# Patient Record
Sex: FEMALE | Race: WHITE | Employment: UNEMPLOYED | URBAN - METROPOLITAN AREA
[De-identification: names, ages, dates, MRNs, and addresses within clinical notes are randomized per-mention and may not be internally consistent; named-entity substitution may affect disease eponyms.]

---

## 2017-08-14 ENCOUNTER — HOSPITAL ENCOUNTER (OUTPATIENT)
Dept: PREADMISSION TESTING | Age: 72
Discharge: HOME | End: 2017-08-14
Payer: MEDICARE

## 2017-08-14 VITALS — WEIGHT: 141 LBS | BODY MASS INDEX: 24.98 KG/M2 | HEIGHT: 63 IN

## 2017-08-14 LAB
ANION GAP BLD CALC-SCNC: 12 MMOL/L (ref 3–18)
ATRIAL RATE: 70 BPM
BACTERIA SPEC CULT: NORMAL
BUN SERPL-MCNC: 14 MG/DL (ref 7–18)
BUN/CREAT SERPL: 13 (ref 12–20)
CALCIUM SERPL-MCNC: 8.4 MG/DL (ref 8.5–10.1)
CALCULATED P AXIS, ECG09: 71 DEGREES
CALCULATED R AXIS, ECG10: 61 DEGREES
CALCULATED T AXIS, ECG11: 82 DEGREES
CHLORIDE SERPL-SCNC: 103 MMOL/L (ref 100–108)
CO2 SERPL-SCNC: 26 MMOL/L (ref 21–32)
CREAT SERPL-MCNC: 1.08 MG/DL (ref 0.6–1.3)
DIAGNOSIS, 93000: NORMAL
ERYTHROCYTE [DISTWIDTH] IN BLOOD BY AUTOMATED COUNT: 14.6 % (ref 11.6–14.5)
GLUCOSE SERPL-MCNC: 97 MG/DL (ref 74–99)
HCT VFR BLD AUTO: 36.5 % (ref 35–45)
HGB BLD-MCNC: 11.9 G/DL (ref 12–16)
MCH RBC QN AUTO: 31.2 PG (ref 24–34)
MCHC RBC AUTO-ENTMCNC: 32.6 G/DL (ref 31–37)
MCV RBC AUTO: 95.5 FL (ref 74–97)
P-R INTERVAL, ECG05: 162 MS
PLATELET # BLD AUTO: 308 K/UL (ref 135–420)
PMV BLD AUTO: 9.8 FL (ref 9.2–11.8)
POTASSIUM SERPL-SCNC: 3.2 MMOL/L (ref 3.5–5.5)
Q-T INTERVAL, ECG07: 434 MS
QRS DURATION, ECG06: 100 MS
QTC CALCULATION (BEZET), ECG08: 468 MS
RBC # BLD AUTO: 3.82 M/UL (ref 4.2–5.3)
SERVICE CMNT-IMP: NORMAL
SODIUM SERPL-SCNC: 141 MMOL/L (ref 136–145)
VENTRICULAR RATE, ECG03: 70 BPM
WBC # BLD AUTO: 6.6 K/UL (ref 4.6–13.2)

## 2017-08-14 PROCEDURE — 93005 ELECTROCARDIOGRAM TRACING: CPT

## 2017-08-14 PROCEDURE — 85027 COMPLETE CBC AUTOMATED: CPT | Performed by: ORTHOPAEDIC SURGERY

## 2017-08-14 PROCEDURE — 80048 BASIC METABOLIC PNL TOTAL CA: CPT | Performed by: ORTHOPAEDIC SURGERY

## 2017-08-14 PROCEDURE — 87641 MR-STAPH DNA AMP PROBE: CPT | Performed by: ORTHOPAEDIC SURGERY

## 2017-08-14 RX ORDER — HYDROCODONE BITARTRATE AND ACETAMINOPHEN 10; 300 MG/1; MG/1
1 TABLET ORAL AS NEEDED
COMMUNITY
End: 2017-08-29

## 2017-08-14 RX ORDER — CEFAZOLIN SODIUM 2 G/50ML
2 SOLUTION INTRAVENOUS ONCE
Status: CANCELLED | OUTPATIENT
Start: 2017-08-14 | End: 2017-08-14

## 2017-08-14 RX ORDER — SODIUM CHLORIDE, SODIUM LACTATE, POTASSIUM CHLORIDE, CALCIUM CHLORIDE 600; 310; 30; 20 MG/100ML; MG/100ML; MG/100ML; MG/100ML
125 INJECTION, SOLUTION INTRAVENOUS CONTINUOUS
Status: CANCELLED | OUTPATIENT
Start: 2017-08-14

## 2017-08-14 RX ORDER — ATORVASTATIN CALCIUM 20 MG/1
20 TABLET, FILM COATED ORAL DAILY
COMMUNITY
End: 2022-10-06 | Stop reason: CLARIF

## 2017-08-14 NOTE — PERIOP NOTES
Denies sleep apnea does not meet criteria for special population ,lex prep reviewed ,no IV or BP left arm

## 2017-08-21 ENCOUNTER — HOSPITAL ENCOUNTER (OUTPATIENT)
Dept: PREADMISSION TESTING | Age: 72
Discharge: HOME | End: 2017-08-21
Attending: ORTHOPAEDIC SURGERY
Payer: MEDICARE

## 2017-08-21 LAB — POTASSIUM SERPL-SCNC: 3 MMOL/L (ref 3.5–5.5)

## 2017-08-21 PROCEDURE — 36415 COLL VENOUS BLD VENIPUNCTURE: CPT | Performed by: ORTHOPAEDIC SURGERY

## 2017-08-21 PROCEDURE — 84132 ASSAY OF SERUM POTASSIUM: CPT | Performed by: ORTHOPAEDIC SURGERY

## 2017-08-21 NOTE — H&P
Patient Name:  Bart Toribio  YOB: 1945      Chief Complaint:  Lower back pain and bilateral hip pain. History of Chief Complaint:  Ms. Fabi Brewer is a 75-year-old female who is being seen for lower back pain and bilateral hip pain. She has had lower back pain and bilateral hip pain for one year. She does not remember a specific injury. She is bent forwards walking and has to hold onto something all the time. She has a history of L4-5 stenosis in 2008. The pain is constant. She has no numbness and no problems with her balance. She has weakness. She has chronic urinary incontinence. She is using a motorized cart. She finds that walking for any length of time makes the pain worse. Sitting makes it better. She has had no chiropractic treatment, on physical therapy, and no injections. She has been on Vicodin 10 mg one every four hours for one year per oncology, Nicholas Viveros NP. She has a history of two lumbar surgeries done by Dr. Claude Mathieu at [facility] including an L5-S1 fusion over 10 years ago. She had an MRI scan done at [facility] on 03/10/17.      Past Medical/Surgical History:    Disease/Disorder Type Date Side Surgery Date Side Comment   Cancer, breast       AMD 07/18/2017 -   High cholesterol       AMD 07/18/2017 -   Hypertension            Allergies:    Ingredient Reaction Medication Name Comment   IODINE      IODINATED CONTRAST MEDIA - ORAL AND IV DYE  Aleve   Triamterene          Current Medications:    Medication Directions   sertraline 100 mg tablet take 1 tablet by oral route  every day   amlodipine 10 mg tablet take 1 tablet by oral route  every day   atorvastatin 20 mg tablet take 1 tablet by oral route  every day   Aspirin Low Dose 81 mg tablet,delayed release take 1 tablet by oral route  every day   hydrocodone 10 mg-acetaminophen 325 mg tablet take 1 tablet by oral route  every 4 hours as needed for pain     Social History:      SMOKING  Status Tobacco Type Units Per Day Yrs Used   Current every day smoker        ALCOHOL  There is no history of alcohol use     Family History:    Disease Detail Family Member Age Cause of Death Comments   Cancer, unknown Mother  N      Review of Systems:    Pertinent positives include shortness of breath. Pertinent negatives include blurred vision, chest pain, chills, cold, discharge of the eyes, dizziness, double vision, fever, headache, hearing loss, heart murmur, itching of the eyes, palpitations, redness of the eyes, rheumatic fever, ringing in ears, sore throat/hoarseness, weight change, abdominal pain, anxiety, bipolar disorder, bladder/kidney infection, bloody stool, blood in urine, burning sensation, changes in mood, chronic cough, depression, diarrhea, difficulty breathing, difficulty swallowing, fainting, frequent urinating, fracture/dislocation, gas/bloating, gout, hemorrhoids, incontinence, joint pain, joint stiffness, loss of balance, memory loss, muscle weakness, nausea/vomiting, numbness/tingling, pain on breathing, painful urination, psoriasis, rash/itching, Raynaud's phenomenon, rheumatoid disease, seizure disorder, sprain/strain, swelling of feet, tendonitis, varicose veins and wheezing. Vitals:  Date BP Pulse Temp (F) Resp. (per min.) Height (Total in.) Weight (lbs.) BMI   07/13/2017     63.00 145.00 25.69     Physical Examination:    General:  The patient appears healthy. Cardiovascular:  Arterial pulses are normal.  Skin:  The skin is normal appearing with no contusions or wounds noted. Heart- RRR  Lungs-CTA berry  Abdomen- +BS,soft,nontender  Musculoskeletal:  There is tenderness of the right PSIS and right greater trochanter. The thoracolumbar spine has normal kyphosis, a normal appearance, and no scoliosis. There is full range of motion of the cervical, thoracic, and lumbar spine and of the hips, knees, and ankles. Straight leg raising and crossed straight leg raising tests are negative.       Neurological:  There is no weakness in the thoracic, lumbar, or sacral spine or in the lower extremities or hips. Deep tendon reflexes are present and normal bilaterally. Ankle and knee jerks are normal with no clonus. Radiograph Examination:  An AP view of the pelvis was obtained and interpreted in the office 7/13/17 and reveals a left-sided hip replacement. AP, lateral, bilateral oblique, flexion and extension views of the lumbar spine were obtained and interpreted in the officeand reveal degenerative disc disease at L1 through S1 with L5-S1 pedicle screws and posterior lumbar interbody fusion in place with spondylolisthesis at L4-5. Review of her MRI scan of the lumbar spine [facility] 3/17/17 reveals severe spinal stenosis and degenerative disc disease at L2 through L5 with a previous fusion at L5-S1. Impression:  Ms. Shania Story and I discussed treatments for her lower back pain, spinal stenosis, and degenerative disc disease condition including surgical intervention, the risks, and benefits as well as the different surgical approaches and decision making. We also discussed nonsurgical care for this condition including medications, injections, physical therapy, rehabilitation, activity modification, and brace utilization. At this point, she would like to proceed with operative intervention. We will plan for L2 to S1 revision decompression and fusion. The risks versus the benefits as well as the alternatives were fully explained to the patient. Risks include, but are not limited to, paralysis, death, heart attack, stroke, pulmonary embolism, deep vein thrombosis, infection, failure to relieve pain, increase in back or leg pain, reherniation of disc material, need for revision surgery, scarring, spinal fluid leak, bowel or bladder dysfunction, disease transmission, chronic graft site pain, instrumentation failure, pseudarthrosis, and the need for chronic ambulatory assist devices.   The patient states full understanding of the risks and benefits and wishes to proceed.

## 2017-08-21 NOTE — PERIOP NOTES
Phone call to pt. Repeat K+ 3.0.  (Was 3.2)  Pt instructed to contact PCP re K+. Valorie Alves at [facility] notified.

## 2017-08-22 PROBLEM — M51.26 HNP (HERNIATED NUCLEUS PULPOSUS), LUMBAR: Status: ACTIVE | Noted: 2017-08-22

## 2017-08-22 PROBLEM — Z98.890 STATUS POST LUMBAR SURGERY: Status: ACTIVE | Noted: 2017-08-22

## 2017-08-22 PROBLEM — M51.36 DDD (DEGENERATIVE DISC DISEASE), LUMBAR: Status: ACTIVE | Noted: 2017-08-22

## 2017-08-22 PROBLEM — M48.061 SPINAL STENOSIS, LUMBAR: Status: ACTIVE | Noted: 2017-08-22

## 2017-08-28 ENCOUNTER — APPOINTMENT (OUTPATIENT)
Dept: GENERAL RADIOLOGY | Age: 72
End: 2017-08-28
Attending: ORTHOPAEDIC SURGERY
Payer: MEDICARE

## 2017-08-28 ENCOUNTER — ANESTHESIA (OUTPATIENT)
Dept: SURGERY | Age: 72
End: 2017-08-28
Payer: MEDICARE

## 2017-08-28 ENCOUNTER — ANESTHESIA EVENT (OUTPATIENT)
Dept: SURGERY | Age: 72
End: 2017-08-28
Payer: MEDICARE

## 2017-08-28 ENCOUNTER — HOSPITAL ENCOUNTER (OUTPATIENT)
Age: 72
Setting detail: OBSERVATION
Discharge: HOME HEALTH | End: 2017-08-29
Attending: ORTHOPAEDIC SURGERY | Admitting: ORTHOPAEDIC SURGERY
Payer: MEDICARE

## 2017-08-28 LAB
ABO + RH BLD: NORMAL
BLOOD GROUP ANTIBODIES SERPL: NORMAL
SPECIMEN EXP DATE BLD: NORMAL

## 2017-08-28 PROCEDURE — 74011250636 HC RX REV CODE- 250/636: Performed by: ORTHOPAEDIC SURGERY

## 2017-08-28 PROCEDURE — 77030018836 HC SOL IRR NACL ICUM -A: Performed by: ORTHOPAEDIC SURGERY

## 2017-08-28 PROCEDURE — 36415 COLL VENOUS BLD VENIPUNCTURE: CPT | Performed by: ORTHOPAEDIC SURGERY

## 2017-08-28 PROCEDURE — 74011000250 HC RX REV CODE- 250

## 2017-08-28 PROCEDURE — C1713 ANCHOR/SCREW BN/BN,TIS/BN: HCPCS | Performed by: ORTHOPAEDIC SURGERY

## 2017-08-28 PROCEDURE — 77030032490 HC SLV COMPR SCD KNE COVD -B: Performed by: ORTHOPAEDIC SURGERY

## 2017-08-28 PROCEDURE — 77030012406 HC DRN WND PENRS BARD -A: Performed by: ORTHOPAEDIC SURGERY

## 2017-08-28 PROCEDURE — 74011250636 HC RX REV CODE- 250/636: Performed by: ANESTHESIOLOGY

## 2017-08-28 PROCEDURE — 74011000250 HC RX REV CODE- 250: Performed by: PHYSICIAN ASSISTANT

## 2017-08-28 PROCEDURE — 77030008683 HC TU ET CUF COVD -A: Performed by: NURSE ANESTHETIST, CERTIFIED REGISTERED

## 2017-08-28 PROCEDURE — 74011250637 HC RX REV CODE- 250/637: Performed by: PHYSICIAN ASSISTANT

## 2017-08-28 PROCEDURE — 74011250636 HC RX REV CODE- 250/636: Performed by: PHYSICIAN ASSISTANT

## 2017-08-28 PROCEDURE — 77030020782 HC GWN BAIR PAWS FLX 3M -B: Performed by: ORTHOPAEDIC SURGERY

## 2017-08-28 PROCEDURE — 74011000258 HC RX REV CODE- 258: Performed by: PHYSICIAN ASSISTANT

## 2017-08-28 PROCEDURE — 86900 BLOOD TYPING SEROLOGIC ABO: CPT | Performed by: ORTHOPAEDIC SURGERY

## 2017-08-28 PROCEDURE — 77030036646 HC GRFT DBM PTTY H-GENIN 10CC SPFT -G: Performed by: ORTHOPAEDIC SURGERY

## 2017-08-28 PROCEDURE — 76010000132 HC OR TIME 2.5 TO 3 HR: Performed by: ORTHOPAEDIC SURGERY

## 2017-08-28 PROCEDURE — 74011250636 HC RX REV CODE- 250/636

## 2017-08-28 PROCEDURE — 76060000036 HC ANESTHESIA 2.5 TO 3 HR: Performed by: ORTHOPAEDIC SURGERY

## 2017-08-28 PROCEDURE — 77030020407 HC IV BLD WRMR ST 3M -A: Performed by: NURSE ANESTHETIST, CERTIFIED REGISTERED

## 2017-08-28 PROCEDURE — 74011000250 HC RX REV CODE- 250: Performed by: ANESTHESIOLOGY

## 2017-08-28 PROCEDURE — 99218 HC RM OBSERVATION: CPT

## 2017-08-28 PROCEDURE — 77030013708 HC HNDPC SUC IRR PULS STRY –B: Performed by: ORTHOPAEDIC SURGERY

## 2017-08-28 PROCEDURE — 77030025167 HC ELECTRD VES SEAL 1 MEDT -F: Performed by: ORTHOPAEDIC SURGERY

## 2017-08-28 PROCEDURE — 77030003029 HC SUT VCRL J&J -B: Performed by: ORTHOPAEDIC SURGERY

## 2017-08-28 PROCEDURE — 77030011640 HC PAD GRND REM COVD -A: Performed by: ORTHOPAEDIC SURGERY

## 2017-08-28 PROCEDURE — 77030020262 HC SOL INJ SOD CL 0.9% 100ML: Performed by: ORTHOPAEDIC SURGERY

## 2017-08-28 PROCEDURE — 77030003028 HC SUT VCRL J&J -A: Performed by: ORTHOPAEDIC SURGERY

## 2017-08-28 PROCEDURE — 77030008462 HC STPLR SKN PROX J&J -A: Performed by: ORTHOPAEDIC SURGERY

## 2017-08-28 PROCEDURE — 77030013079 HC BLNKT BAIR HGGR 3M -A: Performed by: NURSE ANESTHETIST, CERTIFIED REGISTERED

## 2017-08-28 PROCEDURE — 77030006643: Performed by: NURSE ANESTHETIST, CERTIFIED REGISTERED

## 2017-08-28 PROCEDURE — 77030034849: Performed by: ORTHOPAEDIC SURGERY

## 2017-08-28 PROCEDURE — 77030020255 HC SOL INJ LR 1000ML BG: Performed by: ORTHOPAEDIC SURGERY

## 2017-08-28 PROCEDURE — 77030008477 HC STYL SATN SLP COVD -A: Performed by: NURSE ANESTHETIST, CERTIFIED REGISTERED

## 2017-08-28 PROCEDURE — 77030004402 HC BUR NEUR STRY -C: Performed by: ORTHOPAEDIC SURGERY

## 2017-08-28 PROCEDURE — 74011000250 HC RX REV CODE- 250: Performed by: ORTHOPAEDIC SURGERY

## 2017-08-28 PROCEDURE — 77030012891

## 2017-08-28 PROCEDURE — 76210000017 HC OR PH I REC 1.5 TO 2 HR: Performed by: ORTHOPAEDIC SURGERY

## 2017-08-28 DEVICE — SET SCR SPNL OD8-9MM PEDFUSE: Type: IMPLANTABLE DEVICE | Site: SPINE LUMBAR | Status: FUNCTIONAL

## 2017-08-28 DEVICE — SCREW SPNL L50MM OD7MM SLD GEN 3 PEDFUSE RESET: Type: IMPLANTABLE DEVICE | Site: SPINE LUMBAR | Status: FUNCTIONAL

## 2017-08-28 DEVICE — SCREW SPNL CRV 45-65 MM ADJ CRUX ASSY: Type: IMPLANTABLE DEVICE | Site: SPINE LUMBAR | Status: FUNCTIONAL

## 2017-08-28 DEVICE — ROD SPNL L120MM OD5.5MM LORDTC PEDFUSE: Type: IMPLANTABLE DEVICE | Site: SPINE LUMBAR | Status: FUNCTIONAL

## 2017-08-28 DEVICE — SET SCR SPNL L5-7MM PEDFUSE: Type: IMPLANTABLE DEVICE | Site: SPINE LUMBAR | Status: FUNCTIONAL

## 2017-08-28 DEVICE — SCREW SPNL L55MM OD7MM SLD GEN 3 PEDFUSE RESET: Type: IMPLANTABLE DEVICE | Site: SPINE LUMBAR | Status: FUNCTIONAL

## 2017-08-28 DEVICE — IMPLANTABLE DEVICE: Type: IMPLANTABLE DEVICE | Site: SPINE LUMBAR | Status: FUNCTIONAL

## 2017-08-28 DEVICE — ROD SPNL 5.5 MM DIA CVD LUM 130 MM LEN SMOOTH UN CUT W/O: Type: IMPLANTABLE DEVICE | Site: SPINE LUMBAR | Status: FUNCTIONAL

## 2017-08-28 RX ORDER — SODIUM CHLORIDE 0.9 % (FLUSH) 0.9 %
5-10 SYRINGE (ML) INJECTION AS NEEDED
Status: DISCONTINUED | OUTPATIENT
Start: 2017-08-28 | End: 2017-08-29 | Stop reason: HOSPADM

## 2017-08-28 RX ORDER — NALOXONE HYDROCHLORIDE 0.4 MG/ML
0.4 INJECTION, SOLUTION INTRAMUSCULAR; INTRAVENOUS; SUBCUTANEOUS AS NEEDED
Status: DISCONTINUED | OUTPATIENT
Start: 2017-08-28 | End: 2017-08-28 | Stop reason: HOSPADM

## 2017-08-28 RX ORDER — GLYCOPYRROLATE 0.2 MG/ML
INJECTION INTRAMUSCULAR; INTRAVENOUS AS NEEDED
Status: DISCONTINUED | OUTPATIENT
Start: 2017-08-28 | End: 2017-08-28 | Stop reason: HOSPADM

## 2017-08-28 RX ORDER — SODIUM CHLORIDE, SODIUM LACTATE, POTASSIUM CHLORIDE, CALCIUM CHLORIDE 600; 310; 30; 20 MG/100ML; MG/100ML; MG/100ML; MG/100ML
125 INJECTION, SOLUTION INTRAVENOUS CONTINUOUS
Status: DISCONTINUED | OUTPATIENT
Start: 2017-08-28 | End: 2017-08-29 | Stop reason: HOSPADM

## 2017-08-28 RX ORDER — DIPHENHYDRAMINE HYDROCHLORIDE 50 MG/ML
12.5 INJECTION, SOLUTION INTRAMUSCULAR; INTRAVENOUS
Status: DISCONTINUED | OUTPATIENT
Start: 2017-08-28 | End: 2017-08-29 | Stop reason: HOSPADM

## 2017-08-28 RX ORDER — TEMAZEPAM 15 MG/1
30 CAPSULE ORAL
Status: DISCONTINUED | OUTPATIENT
Start: 2017-08-28 | End: 2017-08-29 | Stop reason: HOSPADM

## 2017-08-28 RX ORDER — CEFAZOLIN SODIUM 2 G/50ML
2 SOLUTION INTRAVENOUS ONCE
Status: COMPLETED | OUTPATIENT
Start: 2017-08-28 | End: 2017-08-28

## 2017-08-28 RX ORDER — EPHEDRINE SULFATE/0.9% NACL/PF 25 MG/5 ML
SYRINGE (ML) INTRAVENOUS AS NEEDED
Status: DISCONTINUED | OUTPATIENT
Start: 2017-08-28 | End: 2017-08-28 | Stop reason: HOSPADM

## 2017-08-28 RX ORDER — AMLODIPINE BESYLATE 5 MG/1
10 TABLET ORAL DAILY
Status: DISCONTINUED | OUTPATIENT
Start: 2017-08-29 | End: 2017-08-29 | Stop reason: HOSPADM

## 2017-08-28 RX ORDER — SODIUM CHLORIDE 0.9 % (FLUSH) 0.9 %
5-10 SYRINGE (ML) INJECTION AS NEEDED
Status: DISCONTINUED | OUTPATIENT
Start: 2017-08-28 | End: 2017-08-28 | Stop reason: HOSPADM

## 2017-08-28 RX ORDER — NEOSTIGMINE METHYLSULFATE 5 MG/5 ML
SYRINGE (ML) INTRAVENOUS AS NEEDED
Status: DISCONTINUED | OUTPATIENT
Start: 2017-08-28 | End: 2017-08-28 | Stop reason: HOSPADM

## 2017-08-28 RX ORDER — OXYCODONE AND ACETAMINOPHEN 7.5; 325 MG/1; MG/1
1 TABLET ORAL
Status: DISCONTINUED | OUTPATIENT
Start: 2017-08-28 | End: 2017-08-29

## 2017-08-28 RX ORDER — HYDROMORPHONE HYDROCHLORIDE 1 MG/ML
INJECTION, SOLUTION INTRAMUSCULAR; INTRAVENOUS; SUBCUTANEOUS AS NEEDED
Status: DISCONTINUED | OUTPATIENT
Start: 2017-08-28 | End: 2017-08-28 | Stop reason: HOSPADM

## 2017-08-28 RX ORDER — DEXAMETHASONE SODIUM PHOSPHATE 4 MG/ML
INJECTION, SOLUTION INTRA-ARTICULAR; INTRALESIONAL; INTRAMUSCULAR; INTRAVENOUS; SOFT TISSUE AS NEEDED
Status: DISCONTINUED | OUTPATIENT
Start: 2017-08-28 | End: 2017-08-28 | Stop reason: HOSPADM

## 2017-08-28 RX ORDER — FENTANYL CITRATE 50 UG/ML
INJECTION, SOLUTION INTRAMUSCULAR; INTRAVENOUS AS NEEDED
Status: DISCONTINUED | OUTPATIENT
Start: 2017-08-28 | End: 2017-08-28 | Stop reason: HOSPADM

## 2017-08-28 RX ORDER — DIAZEPAM 5 MG/1
2.5 TABLET ORAL
Status: DISCONTINUED | OUTPATIENT
Start: 2017-08-28 | End: 2017-08-29

## 2017-08-28 RX ORDER — LIDOCAINE HYDROCHLORIDE 20 MG/ML
INJECTION, SOLUTION EPIDURAL; INFILTRATION; INTRACAUDAL; PERINEURAL AS NEEDED
Status: DISCONTINUED | OUTPATIENT
Start: 2017-08-28 | End: 2017-08-28 | Stop reason: HOSPADM

## 2017-08-28 RX ORDER — ACETAMINOPHEN 10 MG/ML
960 INJECTION, SOLUTION INTRAVENOUS ONCE
Status: COMPLETED | OUTPATIENT
Start: 2017-08-28 | End: 2017-08-28

## 2017-08-28 RX ORDER — ONDANSETRON 4 MG/1
4 TABLET, ORALLY DISINTEGRATING ORAL
Status: DISCONTINUED | OUTPATIENT
Start: 2017-08-28 | End: 2017-08-29 | Stop reason: HOSPADM

## 2017-08-28 RX ORDER — LABETALOL HCL 20 MG/4 ML
SYRINGE (ML) INTRAVENOUS AS NEEDED
Status: DISCONTINUED | OUTPATIENT
Start: 2017-08-28 | End: 2017-08-28 | Stop reason: HOSPADM

## 2017-08-28 RX ORDER — FENTANYL CITRATE 50 UG/ML
50 INJECTION, SOLUTION INTRAMUSCULAR; INTRAVENOUS
Status: DISCONTINUED | OUTPATIENT
Start: 2017-08-28 | End: 2017-08-28 | Stop reason: HOSPADM

## 2017-08-28 RX ORDER — METOCLOPRAMIDE HYDROCHLORIDE 5 MG/ML
INJECTION INTRAMUSCULAR; INTRAVENOUS AS NEEDED
Status: DISCONTINUED | OUTPATIENT
Start: 2017-08-28 | End: 2017-08-28 | Stop reason: HOSPADM

## 2017-08-28 RX ORDER — SODIUM CHLORIDE 0.9 % (FLUSH) 0.9 %
5-10 SYRINGE (ML) INJECTION EVERY 8 HOURS
Status: DISCONTINUED | OUTPATIENT
Start: 2017-08-28 | End: 2017-08-29 | Stop reason: HOSPADM

## 2017-08-28 RX ORDER — OXYCODONE AND ACETAMINOPHEN 5; 325 MG/1; MG/1
1 TABLET ORAL
Status: DISCONTINUED | OUTPATIENT
Start: 2017-08-28 | End: 2017-08-29

## 2017-08-28 RX ORDER — ROCURONIUM BROMIDE 10 MG/ML
INJECTION, SOLUTION INTRAVENOUS AS NEEDED
Status: DISCONTINUED | OUTPATIENT
Start: 2017-08-28 | End: 2017-08-28 | Stop reason: HOSPADM

## 2017-08-28 RX ORDER — FLUMAZENIL 0.1 MG/ML
0.2 INJECTION INTRAVENOUS
Status: DISCONTINUED | OUTPATIENT
Start: 2017-08-28 | End: 2017-08-28 | Stop reason: HOSPADM

## 2017-08-28 RX ORDER — ACETAMINOPHEN 325 MG/1
650 TABLET ORAL
Status: DISCONTINUED | OUTPATIENT
Start: 2017-08-28 | End: 2017-08-29 | Stop reason: HOSPADM

## 2017-08-28 RX ORDER — CEFAZOLIN SODIUM 2 G/50ML
2 SOLUTION INTRAVENOUS EVERY 8 HOURS
Status: COMPLETED | OUTPATIENT
Start: 2017-08-28 | End: 2017-08-29

## 2017-08-28 RX ORDER — ATORVASTATIN CALCIUM 20 MG/1
20 TABLET, FILM COATED ORAL DAILY
Status: DISCONTINUED | OUTPATIENT
Start: 2017-08-29 | End: 2017-08-29 | Stop reason: HOSPADM

## 2017-08-28 RX ORDER — SODIUM CHLORIDE, SODIUM LACTATE, POTASSIUM CHLORIDE, CALCIUM CHLORIDE 600; 310; 30; 20 MG/100ML; MG/100ML; MG/100ML; MG/100ML
125 INJECTION, SOLUTION INTRAVENOUS CONTINUOUS
Status: DISCONTINUED | OUTPATIENT
Start: 2017-08-28 | End: 2017-08-28 | Stop reason: HOSPADM

## 2017-08-28 RX ORDER — ONDANSETRON 2 MG/ML
INJECTION INTRAMUSCULAR; INTRAVENOUS AS NEEDED
Status: DISCONTINUED | OUTPATIENT
Start: 2017-08-28 | End: 2017-08-28 | Stop reason: HOSPADM

## 2017-08-28 RX ORDER — MIDAZOLAM HYDROCHLORIDE 1 MG/ML
INJECTION, SOLUTION INTRAMUSCULAR; INTRAVENOUS AS NEEDED
Status: DISCONTINUED | OUTPATIENT
Start: 2017-08-28 | End: 2017-08-28 | Stop reason: HOSPADM

## 2017-08-28 RX ORDER — LABETALOL HYDROCHLORIDE 5 MG/ML
5 INJECTION, SOLUTION INTRAVENOUS ONCE
Status: COMPLETED | OUTPATIENT
Start: 2017-08-28 | End: 2017-08-28

## 2017-08-28 RX ORDER — DOCUSATE SODIUM 100 MG/1
100 CAPSULE, LIQUID FILLED ORAL 2 TIMES DAILY
Status: DISCONTINUED | OUTPATIENT
Start: 2017-08-28 | End: 2017-08-29 | Stop reason: HOSPADM

## 2017-08-28 RX ORDER — SERTRALINE HYDROCHLORIDE 50 MG/1
100 TABLET, FILM COATED ORAL DAILY
Status: DISCONTINUED | OUTPATIENT
Start: 2017-08-29 | End: 2017-08-29 | Stop reason: HOSPADM

## 2017-08-28 RX ORDER — PROPOFOL 10 MG/ML
INJECTION, EMULSION INTRAVENOUS AS NEEDED
Status: DISCONTINUED | OUTPATIENT
Start: 2017-08-28 | End: 2017-08-28 | Stop reason: HOSPADM

## 2017-08-28 RX ORDER — NALOXONE HYDROCHLORIDE 0.4 MG/ML
0.1 INJECTION, SOLUTION INTRAMUSCULAR; INTRAVENOUS; SUBCUTANEOUS AS NEEDED
Status: DISCONTINUED | OUTPATIENT
Start: 2017-08-28 | End: 2017-08-29 | Stop reason: HOSPADM

## 2017-08-28 RX ORDER — HYDROMORPHONE HYDROCHLORIDE 1 MG/ML
0.5 INJECTION, SOLUTION INTRAMUSCULAR; INTRAVENOUS; SUBCUTANEOUS
Status: DISCONTINUED | OUTPATIENT
Start: 2017-08-28 | End: 2017-08-28 | Stop reason: HOSPADM

## 2017-08-28 RX ADMIN — FENTANYL CITRATE 50 MCG: 50 INJECTION, SOLUTION INTRAMUSCULAR; INTRAVENOUS at 16:17

## 2017-08-28 RX ADMIN — GLYCOPYRROLATE 0.1 MG: 0.2 INJECTION INTRAMUSCULAR; INTRAVENOUS at 14:22

## 2017-08-28 RX ADMIN — DOCUSATE SODIUM 100 MG: 100 CAPSULE, LIQUID FILLED ORAL at 20:40

## 2017-08-28 RX ADMIN — SODIUM CHLORIDE, SODIUM LACTATE, POTASSIUM CHLORIDE, AND CALCIUM CHLORIDE 125 ML/HR: 600; 310; 30; 20 INJECTION, SOLUTION INTRAVENOUS at 11:42

## 2017-08-28 RX ADMIN — GLYCOPYRROLATE 0.1 MG: 0.2 INJECTION INTRAMUSCULAR; INTRAVENOUS at 14:27

## 2017-08-28 RX ADMIN — DEXAMETHASONE SODIUM PHOSPHATE 4 MG: 4 INJECTION, SOLUTION INTRA-ARTICULAR; INTRALESIONAL; INTRAMUSCULAR; INTRAVENOUS; SOFT TISSUE at 15:12

## 2017-08-28 RX ADMIN — FENTANYL CITRATE 50 MCG: 50 INJECTION, SOLUTION INTRAMUSCULAR; INTRAVENOUS at 14:22

## 2017-08-28 RX ADMIN — MIDAZOLAM HYDROCHLORIDE 1 MG: 1 INJECTION, SOLUTION INTRAMUSCULAR; INTRAVENOUS at 14:22

## 2017-08-28 RX ADMIN — FENTANYL CITRATE 50 MCG: 50 INJECTION, SOLUTION INTRAMUSCULAR; INTRAVENOUS at 14:27

## 2017-08-28 RX ADMIN — HYDROMORPHONE HYDROCHLORIDE: 10 INJECTION, SOLUTION INTRAMUSCULAR; INTRAVENOUS; SUBCUTANEOUS at 17:50

## 2017-08-28 RX ADMIN — ACETAMINOPHEN 1000 MG: 10 INJECTION, SOLUTION INTRAVENOUS at 15:14

## 2017-08-28 RX ADMIN — MIDAZOLAM HYDROCHLORIDE 1 MG: 1 INJECTION, SOLUTION INTRAMUSCULAR; INTRAVENOUS at 14:25

## 2017-08-28 RX ADMIN — Medication 5 MG: at 17:17

## 2017-08-28 RX ADMIN — CEFAZOLIN SODIUM 2 G: 2 SOLUTION INTRAVENOUS at 14:40

## 2017-08-28 RX ADMIN — FENTANYL CITRATE 50 MCG: 50 INJECTION, SOLUTION INTRAMUSCULAR; INTRAVENOUS at 15:12

## 2017-08-28 RX ADMIN — ROCURONIUM BROMIDE 10 MG: 10 INJECTION, SOLUTION INTRAVENOUS at 15:47

## 2017-08-28 RX ADMIN — SODIUM CHLORIDE, SODIUM LACTATE, POTASSIUM CHLORIDE, AND CALCIUM CHLORIDE: 600; 310; 30; 20 INJECTION, SOLUTION INTRAVENOUS at 16:03

## 2017-08-28 RX ADMIN — SODIUM CHLORIDE 1 G: 900 INJECTION, SOLUTION INTRAVENOUS at 14:42

## 2017-08-28 RX ADMIN — Medication 2 MG: at 17:08

## 2017-08-28 RX ADMIN — GLYCOPYRROLATE 0.4 MG: 0.2 INJECTION INTRAMUSCULAR; INTRAVENOUS at 17:08

## 2017-08-28 RX ADMIN — Medication 5 MG: at 15:52

## 2017-08-28 RX ADMIN — ROCURONIUM BROMIDE 5 MG: 10 INJECTION, SOLUTION INTRAVENOUS at 16:37

## 2017-08-28 RX ADMIN — FENTANYL CITRATE 25 MCG: 50 INJECTION, SOLUTION INTRAMUSCULAR; INTRAVENOUS at 17:09

## 2017-08-28 RX ADMIN — FENTANYL CITRATE 25 MCG: 50 INJECTION, SOLUTION INTRAMUSCULAR; INTRAVENOUS at 17:12

## 2017-08-28 RX ADMIN — METOCLOPRAMIDE HYDROCHLORIDE 10 MG: 5 INJECTION INTRAMUSCULAR; INTRAVENOUS at 14:22

## 2017-08-28 RX ADMIN — ROCURONIUM BROMIDE 50 MG: 10 INJECTION, SOLUTION INTRAVENOUS at 14:34

## 2017-08-28 RX ADMIN — OXYCODONE HYDROCHLORIDE AND ACETAMINOPHEN 1 TABLET: 7.5; 325 TABLET ORAL at 20:40

## 2017-08-28 RX ADMIN — SODIUM CHLORIDE, SODIUM LACTATE, POTASSIUM CHLORIDE, AND CALCIUM CHLORIDE: 600; 310; 30; 20 INJECTION, SOLUTION INTRAVENOUS at 17:09

## 2017-08-28 RX ADMIN — FENTANYL CITRATE 50 MCG: 50 INJECTION, SOLUTION INTRAMUSCULAR; INTRAVENOUS at 16:55

## 2017-08-28 RX ADMIN — CEFAZOLIN SODIUM 2 G: 2 SOLUTION INTRAVENOUS at 23:03

## 2017-08-28 RX ADMIN — LIDOCAINE HYDROCHLORIDE 100 MG: 20 INJECTION, SOLUTION EPIDURAL; INFILTRATION; INTRACAUDAL; PERINEURAL at 14:27

## 2017-08-28 RX ADMIN — HYDROMORPHONE HYDROCHLORIDE 0.5 MG: 1 INJECTION, SOLUTION INTRAMUSCULAR; INTRAVENOUS; SUBCUTANEOUS at 16:22

## 2017-08-28 RX ADMIN — ONDANSETRON 4 MG: 2 INJECTION INTRAMUSCULAR; INTRAVENOUS at 16:36

## 2017-08-28 RX ADMIN — LABETALOL HYDROCHLORIDE 5 MG: 5 INJECTION INTRAVENOUS at 18:34

## 2017-08-28 RX ADMIN — HYDROMORPHONE HYDROCHLORIDE 0.5 MG: 1 INJECTION, SOLUTION INTRAMUSCULAR; INTRAVENOUS; SUBCUTANEOUS at 16:40

## 2017-08-28 RX ADMIN — PROPOFOL 100 MG: 10 INJECTION, EMULSION INTRAVENOUS at 14:34

## 2017-08-28 RX ADMIN — FENTANYL CITRATE 50 MCG: 50 INJECTION, SOLUTION INTRAMUSCULAR; INTRAVENOUS at 14:32

## 2017-08-28 NOTE — PERIOP NOTES
TRANSFER - OUT REPORT:    Verbal report given to Laura White RN (name) on [place] [facility]  being transferred to [facility] (unit) for routine progression of care       Report consisted of patients Situation, Background, Assessment and   Recommendations(SBAR). Information from the following report(s) SBAR and Kardex was reviewed with the receiving nurse. Lines:   Peripheral IV 01/21/16 Right Forearm (Active)       Peripheral IV 08/28/17 Right Hand (Active)   Site Assessment Clean, dry, & intact 8/28/2017  6:25 PM   Phlebitis Assessment 0 8/28/2017  6:25 PM   Infiltration Assessment 0 8/28/2017  6:25 PM   Dressing Status Clean, dry, & intact 8/28/2017  6:25 PM   Dressing Type Tape;Transparent 8/28/2017  6:25 PM   Hub Color/Line Status Infusing 8/28/2017  6:25 PM        Opportunity for questions and clarification was provided.       Patient transported with:   O2 @ 2 liters

## 2017-08-28 NOTE — IP AVS SNAPSHOT
Dee Lucas 
 
 
 [address] [place] [address] 
[phone] Patient: Baron Glaser MRN: [id number] Current Discharge Medication List  
  
START taking these medications Dose & Instructions Dispensing Information Comments Morning Noon Evening Bedtime  
 diazePAM 5 mg tablet Commonly known as:  VALIUM Your last dose was: Your next dose is:    
   
   
 Dose:  5 mg Take 1 Tab by mouth every eight (8) hours as needed. Max Daily Amount: 15 mg. Quantity:  60 Tab Refills:  0 HYDROmorphone 4 mg tablet Commonly known as:  DILAUDID Your last dose was: Your next dose is:    
   
   
 Dose:  4-8 mg Take 1-2 Tabs by mouth every four (4) hours as needed. Max Daily Amount: 48 mg. Quantity:  90 Tab Refills:  0 CONTINUE these medications which have NOT CHANGED Dose & Instructions Dispensing Information Comments Morning Noon Evening Bedtime  
 amLODIPine 10 mg tablet Commonly known as:  Tatiana Daniel Your last dose was: Your next dose is:    
   
   
 Dose:  10 mg Take 10 mg by mouth daily. Indications: HYPERTENSION Refills:  0  
     
   
   
   
  
 atorvastatin 20 mg tablet Commonly known as:  LIPITOR Your last dose was: Your next dose is:    
   
   
 Dose:  20 mg Take 20 mg by mouth daily. Refills:  0  
     
   
   
   
  
 sertraline 100 mg tablet Commonly known as:  ZOLOFT Your last dose was: Your next dose is:    
   
   
 Dose:  100 mg Take 100 mg by mouth daily. Indications: ANXIETY WITH DEPRESSION Refills:  0 STOP taking these medications   
 aspirin delayed-release 81 mg tablet HYDROcodone-acetaminophen  mg Tab per tablet Commonly known as:  XODOL  
   
  
 temazepam 30 mg capsule Commonly known as:  RESTORIL  
   
  
  
  
 Where to Get Your Medications Information on where to get these meds will be given to you by the nurse or doctor. ! Ask your nurse or doctor about these medications  
  diazePAM 5 mg tablet HYDROmorphone 4 mg tablet

## 2017-08-28 NOTE — IP AVS SNAPSHOT
Em Braden 
 
 
 [address] [facility] [address] 
[phone] Patient: Dianne Wu MRN: [id number] You are allergic to the following Allergen Reactions Aleve (Naproxen Sodium) Shortness of Breath Rash Iodinated Contrast- Oral And Iv Dye Rash Cough Triamterene-Hydrochlorothiazid Itching Recent Documentation Height Weight BMI OB Status Smoking Status 1.6 m 63.1 kg 24.66 kg/m2 Hysterectomy Current Every Day Smoker Emergency Contacts Name Discharge Info Relation Home Work Mobile Penny Vale CAREGIVER [3] Daughter [21]   [phone] About your hospitalization You were admitted on:  August 28, 2017 You last received care in the:  [facility] [address] You were discharged on:  August 29, 2017 Unit phone number:  [phone] Why you were hospitalized Your primary diagnosis was:  Spinal Stenosis, Lumbar Your diagnoses also included:  Ddd (Degenerative Disc Disease), Lumbar, Hnp (Herniated Nucleus Pulposus), Lumbar, Status Post Lumbar Surgery Providers Seen During Your Hospitalizations Provider Role Specialty Primary office phone Mallorie Stevens MD Attending Provider Orthopedic Surgery [phone] Your Primary Care Physician (PCP) Primary Care Physician Office Phone Office Fax [address]  [phone] Follow-up Information Follow up With Details Comments Contact Info Dakota Cole MD   [address] 
[phone] Mallorie Stevens MD On 9/12/2017 Follow up appointment @ 10:15am [facility] YASEMIN FARLEY [facility] Orthopedic and [address] [place] [address] 
[phone] [address] [facility] to continue managing your healthcare needs. [phone] Current Discharge Medication List  
  
START taking these medications Dose & Instructions Dispensing Information Comments Morning Noon Evening Bedtime  
 diazePAM 5 mg tablet Commonly known as:  VALIUM Your last dose was: Your next dose is:    
   
   
 Dose:  5 mg Take 1 Tab by mouth every eight (8) hours as needed. Max Daily Amount: 15 mg. Quantity:  60 Tab Refills:  0 HYDROmorphone 4 mg tablet Commonly known as:  DILAUDID Your last dose was: Your next dose is:    
   
   
 Dose:  4-8 mg Take 1-2 Tabs by mouth every four (4) hours as needed. Max Daily Amount: 48 mg. Quantity:  90 Tab Refills:  0 CONTINUE these medications which have NOT CHANGED Dose & Instructions Dispensing Information Comments Morning Noon Evening Bedtime  
 amLODIPine 10 mg tablet Commonly known as:  Dieter Rings Your last dose was: Your next dose is:    
   
   
 Dose:  10 mg Take 10 mg by mouth daily. Indications: HYPERTENSION Refills:  0  
     
   
   
   
  
 atorvastatin 20 mg tablet Commonly known as:  LIPITOR Your last dose was: Your next dose is:    
   
   
 Dose:  20 mg Take 20 mg by mouth daily. Refills:  0  
     
   
   
   
  
 sertraline 100 mg tablet Commonly known as:  ZOLOFT Your last dose was: Your next dose is:    
   
   
 Dose:  100 mg Take 100 mg by mouth daily. Indications: ANXIETY WITH DEPRESSION Refills:  0 STOP taking these medications   
 aspirin delayed-release 81 mg tablet HYDROcodone-acetaminophen  mg Tab per tablet Commonly known as:  XODOL  
   
  
 temazepam 30 mg capsule Commonly known as:  RESTORIL Where to Get Your Medications Information on where to get these meds will be given to you by the nurse or doctor. ! Ask your nurse or doctor about these medications  
  diazePAM 5 mg tablet HYDROmorphone 4 mg tablet Discharge Instructions [address] Dr. Maicol Simon *YOU MUST AVOID SMOKING OR BEING AROUND ANYONE WHO SMOKES. AVOID ALL PRODUCTS THAT CONTAIN NICOTINE. DO NOT TAKE IBUPROFEN OR ANTI--INFLAMMATORIES, AS THESE MAY ALTER THE HEALING OF THE FUSION. ACTIVITIES : 
*The first week after surgery 1. You may be up and walking about the house. 2.  Activities around the house, such as washing dishes, fixing light meals, and your own personal care are fine. 3.  Avoid strenuous activities, such as vacuuming, lifting laundry or grocery bags. 4.  Do not lift anything heavier than 1 gallon of milk (or about 5-8 pounds). 5.  Do not bend over to  items from the ground level until 3 months post-op. *Week 2 and beyond 1. You may gradually increase your activities, but still avoid heavy lifting, pushing/pulling. 2.  Walking is the best way to rebuild strength and stamina. Start SLOWLY and gradually increase the distance a little every week. 3.  Walk at a pace that avoids fatigue or severe pain. Do not try to walk several blocks the first day! As you increase the distance, you may feel tired. If so, stop and rest.  
4.  You should be able to walk several blocks by your first clinic visit. 5.  Follow-up with Dr. Lenora Martinez in 10-14 days. BATHING and INCISION CARE: 
1. The incision may be tender to touch or feel numb: this is normal.  
2.  Keep the incision clean and dry. Do not get incision wet for 5 days. The incision will be closed with sutures under the skin and the skin will be glued. 3.  Do not apply any lotions, ointments or oils on the incision. 4.  If you notice any excessive swelling, redness, or persistent drainage around the incision, notify the office immediately. DRIVIN. You should not drive until after your follow-up appointment.   
2.  You can be in a vehicle for short distances, but if you travel any long distance, please stop about every 30 minutes and walk/stretch. 3.  You should NEVER drive while taking narcotic medication. RETURN TO WORK : 
1. The decision to return to work will be determined on an individual basis. 2.  Many people who have a strenuous job (construction, heavy labor, etc) may need to be off work for up to 12 weeks. 3.  If you need a work note, please let us know as soon as possible, and not the same day you are planning to return to work. NUTRITION : 
1.  Good nutrition is an essential part of healing. 2.  You should eat a balanced diet each day, including fruits, vegetables, dairy products and protein. 3.  Remember to drink plenty of water. 4.  If you have not had a bowel movement within 3 days of surgery, you will need to use a laxative or suppository that can be obtained over-the-counter at your local pharmacy. BONE STIMULATOR: 
1. A spinal bone stimulator may be prescribed for you under certain situations. 2.  A nurse will call you if one has been requested and discuss its use for approximately 4-6 months post-op every day. 3.  This device assists in bone healing and fusion. MEDICATIONS - 
1. You may resume the medications you were taking before surgery, with the general exception of (or DO NOT TAKE) non-steroidal anti-inflammatory medications, such as Motrin, Aleve, Advil Naprosyn, Ibuprofen or aspirin. 2.  You will receive a prescription for pain medication at discharge from the hospital. The pain medication works best if taken before the pain becomes severe. 3.  To reduce stomach upset, always take the medication with food. 4.  Begin to wean yourself off the pain medication during the second week after discharge. 5.  If you need a refill, please call the office during working hours at least 2 days before your prescription runs out. Do not wait until your bottle is empty to call for a refill. 6.  DO NOT drive if you are taking narcotic pain medications. HOME HEALTH CARE: 
1.   A home health care service has been set-up for you to help assist you once you leave the hospital. 
2.  They will contact you either before you leave the hospital or within 24 hours once you have been discharged home. 3. A nurse will assist you with your dressing changes and a Physical Therapist with help you with your therapy needs. CALL THE OFFICE: 
? If you have severe pain unrelieved by the medications, new numbness or tingling in your legs; 
? If you have a fever of 101.0°F or greater;  
? If you notice excessive swelling, redness, or persistent drainage from the incision or IV site; [facility] office number is [phone] from 8:00am to 5:00pm Monday through Friday. After 5:00pm, on weekends, or holidays, please leave a message with our answering service and the doctor on-call will get back to you shortly. Discharge Orders None Introducing [facility]! Sim Hubbard introduces TrustAlert patient portal. Now you can access parts of your medical record, email your doctor's office, and request medication refills online. 1. In your internet browser, go to [email or web address] 2. Click on the First Time User? Click Here link in the Sign In box. You will see the New Member Sign Up page. 3. Enter your TrustAlert Access Code exactly as it appears below. You will not need to use this code after youve completed the sign-up process. If you do not sign up before the expiration date, you must request a new code. · TrustAlert Access Code: [email or web address] Expires: 11/7/2017  1:42 PM 
 
4. Enter the last four digits of your Social Security Number (xxxx) and Date of Birth (mm/dd/yyyy) as indicated and click Submit. You will be taken to the next sign-up page. 5. Create a TrustAlert ID.  This will be your TrustAlert login ID and cannot be changed, so think of one that is secure and easy to remember. 6. Create a [facility] password. You can change your password at any time. 7. Enter your Password Reset Question and Answer. This can be used at a later time if you forget your password. 8. Enter your e-mail address. You will receive e-mail notification when new information is available in [address] 9. Click Sign Up. You can now view and download portions of your medical record. 10. Click the Download Summary menu link to download a portable copy of your medical information. If you have questions, please visit the Frequently Asked Questions section of the [facility] website. Remember, [facility] is NOT to be used for urgent needs. For medical emergencies, dial 911. Now available from your iPhone and Android! General Information Please provide this summary of care documentation to your next provider. Patient Signature:  ____________________________________________________________ Date:  ____________________________________________________________  
  
Marti Valee Provider Signature:  ____________________________________________________________ Date:  ____________________________________________________________

## 2017-08-28 NOTE — INTERVAL H&P NOTE
H&P Update:  Renetta Bhatt was seen and examined. History and physical has been reviewed. The patient has been examined.  There have been no significant clinical changes since the completion of the originally dated History and Physical.    Signed By: Donavan Han MD     August 28, 2017 7:13 AM

## 2017-08-28 NOTE — ANESTHESIA POSTPROCEDURE EVALUATION
Post-Anesthesia Evaluation and Assessment    Cardiovascular Function/Vital Signs  Visit Vitals    /65    Pulse 69    Temp 36.8 °C (98.2 °F)    Resp 12    Ht 5' 3\" (1.6 m)    Wt 63.1 kg (139 lb 3 oz)    SpO2 97%    BMI 24.66 kg/m2       Patient is status post Procedure(s):  L2-S1 REVISION DECOMPRESSION & FUSION W/C-ARM. Nausea/Vomiting: Controlled. Postoperative hydration reviewed and adequate. Pain:  Pain Scale 1: FLACC (08/28/17 1859)  Pain Intensity 1: 0 (08/28/17 1859)   Managed. Neurological Status:   Neuro (WDL): Exceptions to WDL (08/28/17 1821)   At baseline. Mental Status and Level of Consciousness: Arousable. Pulmonary Status:   O2 Device: Nasal cannula (08/28/17 1859)   Adequate oxygenation and airway patent. Complications related to anesthesia: None    Post-anesthesia assessment completed. Patient has met all discharge requirements.     Signed By: Bianka Recio MD    August 28, 2017

## 2017-08-28 NOTE — BRIEF OP NOTE
BRIEF OPERATIVE NOTE    Date of Procedure: 8/28/2017   Preoperative Diagnosis: LUMBAR/LUMBOSACRAL DISK DEGENERATION,LUMBAGO,LUMBAR SPONDYLOLISTHESIS,LUMBAR STENOSIS,ELEVATED CHOLESTEROL, HYPERTENSION  Postoperative Diagnosis: LUMBAR/LUMBOSACRAL DISK DEGENERATION,LUMBAGO,LUMBAR SPONDYLOLISTHESIS,LUMBAR STENOSIS,ELEVATED CHOLESTEROL, HYPERTENSION    Procedure: Procedure(s):  L2-S1 REVISION DECOMPRESSION & FUSION W/C-ARM  Surgeon(s) and Role:     * Reina Muhammad MD - Primary  Assistant: Carola Petty PA-C  Anesthesia: General   Estimated Blood Loss: 150cc  Specimens: * No specimens in log *   Findings: spinal stenosis, DDD, HNP L2-S1. Previous L5-S1 fusion with hardware  Complications: none  Implants:   Implant Name Type Inv.  Item Serial No.  Lot No. LRB No. Used Action   GRAFT PUTTY DBM H-GENIN 10CC --  - [id number]  GRAFT PUTTY DBM H-GENIN 10CC --  [id number] SPINEFRONTIER [id number] N/A 1 Implanted   SCR PDCL RESET SLD 7.0X55MM -- GEN 3 - [id number]  SCR PDCL RESET SLD 7.0X55MM -- GEN 3  SPINEFRONTIER [id number] N/A 4 Implanted   SCR PDCL RESET SLD 7.0X50MM -- GEN 3 - [id number]  SCR PDCL RESET SLD 7.0X50MM -- GEN 3  SPINEFRONTIER [id number] N/A 4 Implanted   SCR PDCL SLD PEDFUSE 8.0X45MM --  - [id number]  SCR PDCL SLD PEDFUSE 8.0X45MM --   SPINEFRONTIER [id number] N/A 2 Implanted   ABENA SP LORDTC PEDFUSE 5.5X120 --  - [id number]  ABENA SP LORDTC PEDFUSE 5.5X120 --   SPINEFRONTIER CF24 N/A 1 Implanted   SCR SET PEDFUSE 8-9MM --  - [id number]  SCR SET PEDFUSE 8-9MM --   SPINEFRONTIER [id number] N/A 2 Implanted   ABENA SP LORDTC PEDFUSE 5.5X130 --  - [id number]  ABENA SP LORDTC PEDFUSE 5.5X130 --   SPINEFRONTIER [id number] N/A 1 Implanted   SCR SET PEDFUSE 5-7MM --  - [id number]  SCR SET PEDFUSE 5-7MM --   SPINEFRONTIER [id number] N/A 8 Implanted   SCR SPNE ADJ CRV 45-65MM -- PEDFUSE - [id number]   SCR SPNE ADJ CRV 45-65MM -- PEDFUSE   SPINEFRONTIER [id number] N/A 1 Implanted

## 2017-08-28 NOTE — ANESTHESIA PREPROCEDURE EVALUATION
Anesthetic History     PONV (mild nausea/vomiting after several of her surgeries in the past; responsive to decadron, zofran and phenergan)          Review of Systems / Medical History  Patient summary reviewed, nursing notes reviewed and pertinent labs reviewed    Pulmonary    COPD (presumed COPD due to 30 pack year smoking history; no diagnosis listed in CC)      Smoker (pt non-compliant with instructions to abstain from smoking on DOS)    Pertinent negatives: No sleep apnea     Neuro/Psych         Psychiatric history (h/o depression)     Cardiovascular    Hypertension (took meds this AM): well controlled          Hyperlipidemia  Pertinent negatives: No past MI and angina  Exercise tolerance: >4 METS  Comments: Moderate risk per clearance note   GI/Hepatic/Renal     GERD (very rare)        Pertinent negatives: No liver disease and renal disease   Endo/Other        Arthritis  Pertinent negatives: No diabetes and obesity   Other Findings   Comments: H/o hypokalemia; currently K+ 4.4           Physical Exam    Airway  Mallampati: II  TM Distance: 4 - 6 cm  Neck ROM: normal range of motion   Mouth opening: Normal     Cardiovascular    Rhythm: regular  Rate: normal         Dental    Dentition: Edentulous     Pulmonary  Breath sounds clear to auscultation               Abdominal  GI exam deferred       Other Findings            Anesthetic Plan    ASA: 2  Anesthesia type: general          Induction: Intravenous  Anesthetic plan and risks discussed with: Patient      Plan GETA. Pt understands risks and agrees to proceed.

## 2017-08-29 VITALS
HEART RATE: 92 BPM | WEIGHT: 139.19 LBS | OXYGEN SATURATION: 98 % | HEIGHT: 63 IN | DIASTOLIC BLOOD PRESSURE: 75 MMHG | SYSTOLIC BLOOD PRESSURE: 147 MMHG | TEMPERATURE: 99.5 F | BODY MASS INDEX: 24.66 KG/M2 | RESPIRATION RATE: 18 BRPM

## 2017-08-29 PROBLEM — M48.061 SPINAL STENOSIS, LUMBAR: Status: RESOLVED | Noted: 2017-08-22 | Resolved: 2017-08-29

## 2017-08-29 PROBLEM — M51.26 HNP (HERNIATED NUCLEUS PULPOSUS), LUMBAR: Status: RESOLVED | Noted: 2017-08-22 | Resolved: 2017-08-29

## 2017-08-29 LAB
ERYTHROCYTE [DISTWIDTH] IN BLOOD BY AUTOMATED COUNT: 14.4 % (ref 11.6–14.5)
HCT VFR BLD AUTO: 34 % (ref 35–45)
HGB BLD-MCNC: 11.1 G/DL (ref 12–16)
MCH RBC QN AUTO: 31.2 PG (ref 24–34)
MCHC RBC AUTO-ENTMCNC: 32.6 G/DL (ref 31–37)
MCV RBC AUTO: 95.5 FL (ref 74–97)
PLATELET # BLD AUTO: 345 K/UL (ref 135–420)
PMV BLD AUTO: 9.7 FL (ref 9.2–11.8)
RBC # BLD AUTO: 3.56 M/UL (ref 4.2–5.3)
WBC # BLD AUTO: 14.3 K/UL (ref 4.6–13.2)

## 2017-08-29 PROCEDURE — 85027 COMPLETE CBC AUTOMATED: CPT | Performed by: PHYSICIAN ASSISTANT

## 2017-08-29 PROCEDURE — 97161 PT EVAL LOW COMPLEX 20 MIN: CPT

## 2017-08-29 PROCEDURE — 74011250637 HC RX REV CODE- 250/637: Performed by: ORTHOPAEDIC SURGERY

## 2017-08-29 PROCEDURE — 99218 HC RM OBSERVATION: CPT

## 2017-08-29 PROCEDURE — 74011250636 HC RX REV CODE- 250/636: Performed by: PHYSICIAN ASSISTANT

## 2017-08-29 PROCEDURE — 97116 GAIT TRAINING THERAPY: CPT

## 2017-08-29 PROCEDURE — 77010033678 HC OXYGEN DAILY

## 2017-08-29 PROCEDURE — 36415 COLL VENOUS BLD VENIPUNCTURE: CPT | Performed by: PHYSICIAN ASSISTANT

## 2017-08-29 PROCEDURE — 74011250637 HC RX REV CODE- 250/637: Performed by: PHYSICIAN ASSISTANT

## 2017-08-29 RX ORDER — HYDROMORPHONE HYDROCHLORIDE 4 MG/1
4-8 TABLET ORAL
Qty: 90 TAB | Refills: 0 | Status: SHIPPED | OUTPATIENT
Start: 2017-08-29 | End: 2017-09-27

## 2017-08-29 RX ORDER — HYDROMORPHONE HYDROCHLORIDE 2 MG/1
2-4 TABLET ORAL
Status: DISCONTINUED | OUTPATIENT
Start: 2017-08-29 | End: 2017-08-29

## 2017-08-29 RX ORDER — HYDROMORPHONE HYDROCHLORIDE 4 MG/1
4-8 TABLET ORAL
Status: DISCONTINUED | OUTPATIENT
Start: 2017-08-29 | End: 2017-08-29 | Stop reason: HOSPADM

## 2017-08-29 RX ORDER — DIAZEPAM 5 MG/1
5 TABLET ORAL
Status: DISCONTINUED | OUTPATIENT
Start: 2017-08-29 | End: 2017-08-29 | Stop reason: HOSPADM

## 2017-08-29 RX ORDER — DIAZEPAM 5 MG/1
5 TABLET ORAL
Qty: 60 TAB | Refills: 0 | Status: SHIPPED | OUTPATIENT
Start: 2017-08-29 | End: 2018-07-10

## 2017-08-29 RX ADMIN — ATORVASTATIN CALCIUM 20 MG: 20 TABLET, FILM COATED ORAL at 08:27

## 2017-08-29 RX ADMIN — HYDROMORPHONE HYDROCHLORIDE 4 MG: 4 TABLET ORAL at 09:44

## 2017-08-29 RX ADMIN — CEFAZOLIN SODIUM 2 G: 2 SOLUTION INTRAVENOUS at 14:38

## 2017-08-29 RX ADMIN — CEFAZOLIN SODIUM 2 G: 2 SOLUTION INTRAVENOUS at 05:41

## 2017-08-29 RX ADMIN — HYDROMORPHONE HYDROCHLORIDE 2 MG: 2 TABLET ORAL at 04:37

## 2017-08-29 RX ADMIN — HYDROMORPHONE HYDROCHLORIDE 2 MG: 2 TABLET ORAL at 05:41

## 2017-08-29 RX ADMIN — DOCUSATE SODIUM 100 MG: 100 CAPSULE, LIQUID FILLED ORAL at 08:27

## 2017-08-29 RX ADMIN — OXYCODONE HYDROCHLORIDE AND ACETAMINOPHEN 1 TABLET: 7.5; 325 TABLET ORAL at 01:59

## 2017-08-29 RX ADMIN — SODIUM CHLORIDE, SODIUM LACTATE, POTASSIUM CHLORIDE, AND CALCIUM CHLORIDE 125 ML/HR: 600; 310; 30; 20 INJECTION, SOLUTION INTRAVENOUS at 00:05

## 2017-08-29 RX ADMIN — HYDROMORPHONE HYDROCHLORIDE 4 MG: 4 TABLET ORAL at 13:24

## 2017-08-29 RX ADMIN — AMLODIPINE BESYLATE 10 MG: 5 TABLET ORAL at 08:23

## 2017-08-29 RX ADMIN — SERTRALINE HYDROCHLORIDE 100 MG: 50 TABLET ORAL at 08:34

## 2017-08-29 RX ADMIN — DIAZEPAM 2.5 MG: 5 TABLET ORAL at 00:04

## 2017-08-29 RX ADMIN — DIAZEPAM 5 MG: 5 TABLET ORAL at 08:27

## 2017-08-29 NOTE — DISCHARGE INSTRUCTIONS
[facility]  Dr. Mccoy Arms ANYONE WHO SMOKES. AVOID ALL PRODUCTS THAT CONTAIN NICOTINE. DO NOT TAKE IBUPROFEN OR ANTI--INFLAMMATORIES, AS THESE MAY ALTER THE HEALING OF THE FUSION. ACTIVITIES :  *The first week after surgery   1. You may be up and walking about the house. 2.  Activities around the house, such as washing dishes, fixing light meals, and your own personal care are fine. 3.  Avoid strenuous activities, such as vacuuming, lifting laundry or grocery bags. 4.  Do not lift anything heavier than 1 gallon of milk (or about 5-8 pounds). 5.  Do not bend over to  items from the ground level until 3 months post-op. *Week 2 and beyond  1. You may gradually increase your activities, but still avoid heavy lifting, pushing/pulling. 2.  Walking is the best way to rebuild strength and stamina. Start SLOWLY and gradually increase the distance a little every week. 3.  Walk at a pace that avoids fatigue or severe pain. Do not try to walk several blocks the first day! As you increase the distance, you may feel tired. If so, stop and rest.   4.  You should be able to walk several blocks by your first clinic visit. 5.  Follow-up with Dr. Deyvi Huang in 10-14 days. BATHING and INCISION CARE:  1. The incision may be tender to touch or feel numb: this is normal.   2.  Keep the incision clean and dry. Do not get incision wet for 5 days. The incision will be closed with sutures under the skin and the skin will be glued. 3.  Do not apply any lotions, ointments or oils on the incision. 4.  If you notice any excessive swelling, redness, or persistent drainage around the incision, notify the office immediately. DRIVIN. You should not drive until after your follow-up appointment. 2.  You can be in a vehicle for short distances, but if you travel any long distance, please stop about every 30 minutes and walk/stretch. 3.  You should NEVER drive while taking narcotic medication. RETURN TO WORK :  1. The decision to return to work will be determined on an individual basis. 2.  Many people who have a strenuous job (construction, heavy labor, etc) may need to be off work for up to 12 weeks. 3.  If you need a work note, please let us know as soon as possible, and not the same day you are planning to return to work. NUTRITION :  1.  Good nutrition is an essential part of healing. 2.  You should eat a balanced diet each day, including fruits, vegetables, dairy products and protein. 3.  Remember to drink plenty of water. 4.  If you have not had a bowel movement within 3 days of surgery, you will need to use a laxative or suppository that can be obtained over-the-counter at your local pharmacy. BONE STIMULATOR:  1. A spinal bone stimulator may be prescribed for you under certain situations. 2.  A nurse will call you if one has been requested and discuss its use for approximately 4-6 months post-op every day. 3.  This device assists in bone healing and fusion. MEDICATIONS -  1. You may resume the medications you were taking before surgery, with the general exception of (or DO NOT TAKE) non-steroidal anti-inflammatory medications, such as Motrin, Aleve, Advil Naprosyn, Ibuprofen or aspirin. 2.  You will receive a prescription for pain medication at discharge from the hospital. The pain medication works best if taken before the pain becomes severe. 3.  To reduce stomach upset, always take the medication with food. 4.  Begin to wean yourself off the pain medication during the second week after discharge. 5.  If you need a refill, please call the office during working hours at least 2 days before your prescription runs out. Do not wait until your bottle is empty to call for a refill. 6.  DO NOT drive if you are taking narcotic pain medications.     HOME HEALTH CARE:  1.   A home health care service has been set-up for you to help assist you once you leave the hospital.  2.  They will contact you either before you leave the hospital or within 24 hours once you have been discharged home. 3. A nurse will assist you with your dressing changes and a Physical Therapist with help you with your therapy needs. CALL THE OFFICE:   If you have severe pain unrelieved by the medications, new numbness or tingling in your legs;   If you have a fever of 101.0°F or greater;    If you notice excessive swelling, redness, or persistent drainage from the incision or IV site; [facility] office number is [phone] from 8:00am to 5:00pm Monday through Friday. After 5:00pm, on weekends, or holidays, please leave a message with our answering service and the doctor on-call will get back to you shortly.

## 2017-08-29 NOTE — PROGRESS NOTES
Problem: Falls - Risk of  Goal: *Absence of Falls  Document Nasim Fall Risk and appropriate interventions in the flowsheet.    Outcome: Progressing Towards Goal  Fall Risk Interventions:  Mobility Interventions: Patient to call before getting OOB, Utilize walker, cane, or other assitive device           Medication Interventions: Patient to call before getting OOB

## 2017-08-29 NOTE — PROGRESS NOTES
2038 - Patient in bed at this time, family at bedside. Patient A&Ox4, 2L NC. Denies chest pain and SOB. 18G IV to right hand  intact and patent. SCD compression device and TEDS bilaterally. ABD/tape dressing to lower back CDI, Hemovac draining and patent. Holden draining. Denies numbness/tingling. Pain 9/10 with a tolerable level of 5/10, pain medication administered per [facility], PCA encouraged. Pt educated on IS use, q2h rounds, pain management, and \"Up for Meals\". Pt verbalized understanding, no concerns voiced. Call bell within reach, bed in lowest position. 8167 - Patient rated pain 10/10, pain medication administered per MAR. No other concerns voiced at this time. Call bell within reach, bed in lowest position. 6730 - Doctor on call paged at this time regarding pain mgmt. 200 - Dr. Tito Devine paged again at this time. 4968 - Doctor paged again at this time. 46 - Spoke with Dr. Tito Devine at this time. Informed doctor of pain mgmt issues and actions taken. Telephone orders with read back for Valium 5mg and Dilaudid po 2-4mg. D/C Valium 2.5 and Percocet pain medications. 3934 - Patient rated pain 8/10, pain medication administered per MAR. Hemovac output from 8841-7155 75ml. Drain does not meet removal criteria. No other concerns voiced at this time. Call bell within reach, bed in lowest position.

## 2017-08-29 NOTE — PROGRESS NOTES
Chart reviewed, met with pt at bedside. Pt plans discharge home, has spouse and family lives next door if needed. FOC offered and pt chose [facility]   for follow up; referral placed with CMS. DME delivered to pt room by [facility].    Care Management Interventions  PCP Verified by CM:  Yes  Transition of Care Consult (CM Consult): [address]: No  Reason Outside [place]: Physician referred to specific agency ([facility] )  Discharge Durable Medical Equipment: Yes  Physical Therapy Consult: Yes  Occupational Therapy Consult: Yes  Current Support Network: Lives with Spouse, Family Lives Nearby  Confirm Follow Up Transport: Family  Plan discussed with Pt/Family/Caregiver: Yes  Freedom of Choice Offered: Yes  Discharge Location  Discharge Placement: Home with home health

## 2017-08-29 NOTE — PROGRESS NOTES
1442  Left message for Dr Brenda Yun or Moody Presley in regard to pt hemovac output of 150ml. Awaiting return call. [address] with [place]. Informed her that pt has cleared PT but hemovac has output of 150ml. Leda Dunlap stated it was ok to pull hemovac.

## 2017-08-29 NOTE — PROGRESS NOTES
Problem: Mobility Impaired (Adult and Pediatric)  Goal: *Acute Goals and Plan of Care (Insert Text)  Goals to be addressed in 1-4 days:  STG  1. Rolling L to R to L modified independent for positioning. 2. Supine to sit to supine S with HR for meals. 3. Sit to stand to sit S with RW in prep for ambulation. LTG  1. Ambulate >150ft S with RW, WBAT, for home/community mobility. 2. Ascend/descend a >4 stair steps CGA with HR for home entry. 3. Tolerate up in chair 1-2 hours for ADLs. 4. Patient/family to be independent with HEP for follow-up care and safe discharge. Outcome: Progressing Towards Goal  PHYSICAL THERAPY EVALUATION     Patient: Mario Rodriguez (51 y.o. female)  Date: 8/29/2017  Primary Diagnosis: LUMBAR/LUMBOSACRAL DISK DEGENERATION,LUMBAGO,LUMBAR SPONDYLOLISTHESIS,LUMBAR STENOSIS,ELEVATED CHOLESTEROL, HYPERTENSION  Spinal stenosis, lumbar  Procedure(s) (LRB):  L2-S1 REVISION DECOMPRESSION & FUSION W/C-ARM (N/A) 1 Day Post-Op   Precautions:   Fall, Back, Spinal      ASSESSMENT :  Based on the objective data described below, the patient presents with lower extremity weakness, decreased gait quality and endurance, impaired stair negotiation, and overall limitations in functional mobility s/p L4-S1 D+F. Pt performed sit to stand with supervision. Patient ambulated 70 feet with RW, GB applied, back brace donned, supervision/CGA. Pt tolerated session fairly as evidenced by increased pain with all mobility, pt reports worse with prolonged standing/ambulation. Patient would benefit from skilled inpatient physical therapy to address deficits, progress as tolerated to achieve long term goals and allow safe discharge. Patient will benefit from skilled intervention to address the above impairments.   Patients rehabilitation potential is considered to be Good  Factors which may influence rehabilitation potential include:   [ ]         None noted  [ ]         Mental ability/status  [ ]         Medical condition  [X]         Home/family situation and support systems  [X]         Safety awareness  [ ]         Pain tolerance/management  [ ]         Other:        PLAN :  Recommendations and Planned Interventions:  [X]           Bed Mobility Training             [X]    Neuromuscular Re-Education  [X]           Transfer Training                   [ ]    Orthotic/Prosthetic Training  [X]           Gait Training                          [ ]    Modalities  [X]           Therapeutic Exercises          [ ]    Edema Management/Control  [X]           Therapeutic Activities            [X]    Patient and Family Training/Education  [ ]           Other (comment):     Frequency/Duration: Patient will be followed by physical therapy twice daily to address goals. Discharge Recommendations: Home Health  Further Equipment Recommendations for Discharge: rolling walker       SUBJECTIVE:   Patient stated I didn't sleep at all and I am having a lot of pain.       OBJECTIVE DATA SUMMARY:       Past Medical History:   Diagnosis Date    Anxiety      Arthritis      Cancer ([facility] [address]       breast cancer, bilat    Depression      Hypercholesteremia      Hypertension      [facility]      Nausea & vomiting       Past Surgical History:   Procedure Laterality Date    HX BLADDER SUSPENSION        HX HYSTERECTOMY        HX KNEE ARTHROSCOPY        HX LUMBAR FUSION        HX MASTECTOMY         bilat    HX ORTHOPAEDIC         surgery for hip fx, not a replacement    HX ORTHOPAEDIC         trigger finger, bilat and left heel    HX ORTHOPAEDIC         left knee     HX OTHER SURGICAL         excisiion of melanoma right buttock    HX TUBAL LIGATION         Barriers to Learning/Limitations: None  Compensate with: N/A  Prior Level of Function/Home Situation: Independent amb s/AD  Home Situation  Home Environment: Private residence  # Steps to Enter: 4  Rails to Enter: Yes  Wheelchair Ramp: Yes  One/Two Story Residence: One story  Living Alone: No  Support Systems: Spouse/Significant Other/Partner, Child(heather)  Patient Expects to be Discharged to[de-identified] Private residence  Current DME Used/Available at Home: franco Alex  Critical Behavior:  Neurologic State: Alert; Appropriate for age  Psychosocial  Purposeful Interaction: Yes  Pt Identified Daily Priority: Clinical issues (comment)  Caritas Process: Establish trust;Attend basic human needs  Caring Interventions: Reassure  Reassure: Therapeutic listening; Acceptance;Caring rounds  Skin Condition/Temp: Dry;Warm  Skin Integrity: Incision (comment) (surgical incision lower back)  Skin Integumentary  Skin Color: Appropriate for ethnicity  Skin Condition/Temp: Dry;Warm  Skin Integrity: Incision (comment) (surgical incision lower back)  Turgor: Non-tenting  Hair Growth: Present  Varicosities: Absent  Strength:    Strength: Generally decreased, functional  Tone & Sensation:   Tone: Normal  Sensation: Impaired  Range Of Motion:  AROM: Generally decreased, functional  Functional Mobility:  Bed Mobility:  Sit to Supine: Contact guard assistance  Transfers:  Sit to Stand: Supervision  Stand to Sit: Supervision  Balance:   Sitting: Intact  Standing: Intact; With support  Ambulation/Gait Training:  Distance (ft): 70 Feet (ft)  Assistive Device: Gait belt;Walker, rolling  Ambulation - Level of Assistance: Supervision  Gait Description (WDL): Exceptions to WDL  Gait Abnormalities: Decreased step clearance  Base of Support: Widened  Speed/Dorene: Slow;Shuffled  Step Length: Right shortened;Left shortened  Swing Pattern: Left asymmetrical;Right asymmetrical  Pain:  Pain Scale 1: Numeric (0 - 10)  Pain Intensity 1: 8  Pain Location 1: Back;Leg (right upper thigh)  Pain Orientation 1: Lower  Pain Description 1: Constant  Pain Intervention(s) 1: Medication (see MAR)  Activity Tolerance:   Fair  Please refer to the flowsheet for vital signs taken during this treatment.   After treatment:   [ ]         Patient left in no apparent distress sitting up in chair  [X]         Patient left in no apparent distress in bed  [X]         Call bell left within reach  [X]         Nursing notified  [ ]         Caregiver present  [ ]         Bed alarm activated      COMMUNICATION/EDUCATION:   [X]         Fall prevention education was provided and the patient/caregiver indicated understanding. [X]         Patient/family have participated as able in goal setting and plan of care. [X]         Patient/family agree to work toward stated goals and plan of care. [ ]         Patient understands intent and goals of therapy, but is neutral about his/her participation. [ ]         Patient is unable to participate in goal setting and plan of care.      Thank you for this referral.  Elliott Mejia   Time Calculation: 23 mins      Eval Complexity: History: MEDIUM  Complexity : 1-2 comorbidities / personal factors will impact the outcome/ POC Exam:LOW Complexity : 1-2 Standardized tests and measures addressing body structure, function, activity limitation and / or participation in recreation  Presentation: LOW Complexity : Stable, uncomplicated  Clinical Decision Making:Low Complexity amb >30 ft, transfers/bed mob c/supervision/CGA Overall Complexity:LOW

## 2017-08-29 NOTE — PROGRESS NOTES
7473 - Assumed care of patient at this time. Patient denies chest pain, shortness of breath, or numbness or tingling in the upper or lower extremities. Dressing on the lower back is clean dry and intact. SCDs and ROXANNA hose in place on the patient. 18g IV in the right hand is patent and infusing LR @ 125mL/hr. Patient currently experiencing 7/10 pain. Bed is in lowest position with the wheels locked. Siderails x 3 are up. Call bell within reach. Patient is currently sitting up in a chair in no apparent distress. 3149 - Head to toe assessment performed at this time. Patient has no complaints of chest pain or shortness of breath. Denies any numbness or tingling in extremities. Lungs are clear to auscultation. Bowel sounds are present in all 4 quadrants. Patient educated how to  actively manage their pain. Patient encouraged to use the incentive spirometer. Patient educated on the side effects of medications. Explained the importance of ambulation. Patient left in bed with call light within reach, bed in lowest position with wheels locked, and siderails x 3 up. Will continue to monitor. 1000 - Patient ambulating in the hallway with physical therapy. Patient then returned to bed. Bed is in the lowest and locked position, call light within reach, and siderails x 3 are up. Physical therapy will return this afternoon. Will continue to monitor. 1230 - Patient ambulated to the restroom and had one occurrence of voiding clear, yellow urine. Patient returned to bed. Will continue to monitor. 1300 - Patient ambulating in the hallway with physical therapy. Patient then returned to bed. Will continue to monitor. 1530 - Removed hemovac. Drain had 60 cc of sanguinous drainage when it was discontinued. Dressing on the lower back changed to a Mepilex dressing. Gauze and transparent dressing used to dress the drain site. 1630 - I have reviewed discharge instructions with the patient. The patient verbalized understanding. Dual AVS reviewed with Radha Menezes RN. IV removed at this time.

## 2017-08-29 NOTE — PROGRESS NOTES
Progress Note POD #1      Patient: Bel Whitaker               Sex: female          DOA: 8/28/2017         YOB: 1945      Surgery: Procedure(s):  L2-S1 REVISION DECOMPRESSION & FUSION W/C-ARM           LOS: 0 days               Subjective:      C/O back pain. Dilaudid helping some. Objective:      Visit Vitals    /65 (BP 1 Location: Right arm, BP Patient Position: At rest)    Pulse 87    Temp 98.7 °F (37.1 °C)    Resp 17    Ht 5' 3\" (1.6 m)    Wt 63.1 kg (139 lb 3 oz)    SpO2 97%    BMI 24.66 kg/m2       Physical Exam:  Neurological: motor strength: 5/5 in lower extremities bilaterally                          sensation: intact to light touch  Patient mobility                         Intake and Output:  Current Shift:     Last three shifts:  08/27 1901 - 08/29 0700  In: 2100 [I.V.:2100]  Out: 2857 [Urine:3670; Drains:150]    Lab/Data Reviewed:    Lab/Data Reviewed:  Lab Results   Component Value Date/Time    WBC 14.3 08/29/2017 02:41 AM    HGB 11.1 08/29/2017 02:41 AM    HCT 34.0 08/29/2017 02:41 AM    PLATELET 841 84/30/3804 02:41 AM    MCV 95.5 08/29/2017 02:41 AM     No results found for: APTT  No results found for: INR, PTMR, PTP, PT1, PT2         Assessment/Plan     Principal Problem:    Spinal stenosis, lumbar (8/22/2017)    Active Problems:    DDD (degenerative disc disease), lumbar (8/22/2017)      HNP (herniated nucleus pulposus), lumbar (8/22/2017)      Status post lumbar surgery (8/22/2017)        1. Stable  2. OOB with PT  3. D/C Planning  4. D/C PCA  5. D/C owens  6. D/C drain & change dressing prior to discharge home.   7.Discharge to home after being cleared by P.T  8. Mild hypotension- Will give fluid bolus now.  9 Increase Dilaudid po

## 2017-08-29 NOTE — ROUTINE PROCESS
Bedside and Verbal shift change report given to CLINT Sands/JANIS Vail RN by Ayanna Huang RN. Report included the following information SBAR, Kardex, OR Summary, Intake/Output and MAR.

## 2017-08-29 NOTE — PROGRESS NOTES
Problem: Mobility Impaired (Adult and Pediatric)  Goal: *Acute Goals and Plan of Care (Insert Text)  Goals to be addressed in 1-4 days:  STG  1. Rolling L to R to L modified independent for positioning. 2. Supine to sit to supine S with HR for meals. 3. Sit to stand to sit S with RW in prep for ambulation. LTG  1. Ambulate >150ft S with RW, WBAT, for home/community mobility. 2. Ascend/descend a >4 stair steps CGA with HR for home entry. 3. Tolerate up in chair 1-2 hours for ADLs. 4. Patient/family to be independent with HEP for follow-up care and safe discharge. Outcome: Progressing Towards Goal  PHYSICAL THERAPY TREATMENT/DISCHARGE     Patient: Jeremiah Anders (14 y.o. female)  Date: 8/29/2017  Diagnosis: LUMBAR/LUMBOSACRAL DISK DEGENERATION,LUMBAGO,LUMBAR SPONDYLOLISTHESIS,LUMBAR STENOSIS,ELEVATED CHOLESTEROL, HYPERTENSION  Spinal stenosis, lumbar Spinal stenosis, lumbar  Procedure(s) (LRB):  L2-S1 REVISION DECOMPRESSION & FUSION W/C-ARM (N/A) 1 Day Post-Op  Precautions: Fall, Back, Spinal  Chart, physical therapy assessment, plan of care and goals were reviewed. ASSESSMENT:  Pt seen in bed w/ IV connected w/ pt's daughter and nurse present in the room. Pt reported 8/10 pain in lower back but willing to participate in PT treatment at this time. Pt's IV was disconnected by nurse prior to treatment session. Pt has met all goals at this time. Pt is able to safely perform log rolling technique to bed mobility task w/ min VCing. Pt able to don LSO safely and independently. Pt was able to ambulate 150 ft w/ RW/GB w/ no signs of LOB at this time. Pt was transferred back to room where pt was given instructions for dressing aids given to pt by PT at this time. Pt was left in bed, w/ call bell and tray in reach, nurse notified of session. Pt has met all goals at this time, DC from acute PT.    Progression toward goals:  [X]      Goals met  [ ]      Improving appropriately and progressing toward goals  [ ] Improving slowly and progressing toward goals  [ ]      Not making progress toward goals and plan of care will be adjusted       PLAN:  Patient will be discharged from physical therapy at this time. Rationale for discharge:  [X] Goals Achieved  [ ] Medardo Crawford  [ ] Patient not participating in therapy  [ ] Other:  Discharge Recommendations:  Home Health  Further Equipment Recommendations for Discharge:  rolling walker       SUBJECTIVE:   Patient stated Well I am ready to go home.       OBJECTIVE DATA SUMMARY:   Critical Behavior:  Neurologic State: Alert, Appropriate for age  Functional Mobility Training:  Bed Mobility:  Supine to Sit: Supervision  Sit to Supine: Supervision  Transfers:  Sit to Stand: Modified independent;Supervision  Stand to Sit: Modified independent;Supervision  Balance:  Sitting: Intact  Standing: Intact; With support  Ambulation/Gait Training:  Distance (ft): 150 Feet (ft)  Assistive Device: Brace/Splint;Gait belt;Walker, rolling  Ambulation - Level of Assistance: Modified independent;Supervision  Gait Description (WDL): Exceptions to WDL  Gait Abnormalities: Decreased step clearance  Base of Support: Widened  Speed/Dorene: Shuffled; Slow  Step Length: Left shortened;Right shortened  Swing Pattern: Left asymmetrical;Right asymmetrical  Therapeutic Exercises:   Spinal packet delivered to pt after treatment session. Pain:  Pain Scale 1: Numeric (0 - 10)  Pain Intensity 1: 7  Pain Location 1: Back  Pain Orientation 1: Lower  Pain Description 1: Constant  Pain Intervention(s) 1: Medication (see MAR)  Activity Tolerance:   Good  Please refer to the flowsheet for vital signs taken during this treatment.   After treatment:   [ ] Patient left in no apparent distress sitting up in chair  [X] Patient left in no apparent distress in bed  [X] Call bell left within reach  [X] Nursing notified  [ ] Caregiver present  [ ] Bed alarm activated  Isidro Sotelo PT   Time Calculation: 10 mins

## 2017-08-29 NOTE — PROGRESS NOTES
Problem: Falls - Risk of  Goal: *Absence of Falls  Document Nasim Fall Risk and appropriate interventions in the flowsheet.    Outcome: Progressing Towards Goal  Fall Risk Interventions:  Mobility Interventions: Patient to call before getting OOB, Utilize walker, cane, or other assitive device           Medication Interventions: Patient to call before getting OOB, Teach patient to arise slowly     Elimination Interventions: Call light in reach, Patient to call for help with toileting needs, Toileting schedule/hourly rounds

## 2017-08-29 NOTE — ROUTINE PROCESS
1961  TRANSFER - IN REPORT:    Verbal report received from MAYRA Washington RN(name) on [employer]  being received from [facility]) for routine post - op      Report consisted of patients Situation, Background, Assessment and   Recommendations(SBAR). Information from the following report(s) SBAR, Kardex, [facility] and Recent Results was reviewed with the receiving nurse. Opportunity for questions and clarification was provided. Assessment completed upon patients arrival to unit and care assumed.

## 2017-08-29 NOTE — PROGRESS NOTES
1905  Pt in room. Vitals stable. PCA pump in place. LR infusing at 125ml/hr. ROXANNA hose applied to BLE. Call light in reach. Bed in locked and low position. Family at bedside.

## 2017-08-29 NOTE — OP NOTES
Patient: Shady Gray MRN: [id number]  SSN: [id number]    YOB: 1945  Age: 74 y.o. Sex: female        Date of Procedure: 8/28/2017   Preoperative Diagnosis: LUMBAR/LUMBOSACRAL DISK DEGENERATION,LUMBAGO,LUMBAR SPONDYLOLISTHESIS,LUMBAR STENOSIS,ELEVATED CHOLESTEROL, HYPERTENSION  Postoperative Diagnosis: LUMBAR/LUMBOSACRAL DISK DEGENERATION,LUMBAGO,LUMBAR SPONDYLOLISTHESIS,LUMBAR STENOSIS,ELEVATED CHOLESTEROL, HYPERTENSION    Procedure: Procedure(s):  L2-S1 REVISION DECOMPRESSION & FUSION W/C-ARM  Surgeon(s) and Role:     * Leah Hidalgo MD - Primary  Assistant: Sabiha Luis PA-C  Anesthesia: General   Estimated Blood Loss: 150cc  Fluids: 1000cc   Specimens: * No specimens in log *   Findings: same  Complications: none  Implants:   Implant Name Type Inv.  Item Serial No.  Lot No. LRB No. Used Action   GRAFT PUTTY DBM H-GENIN 10CC --  - [id number]  GRAFT PUTTY DBM H-GENIN 10CC --  [id number] SPINEFRONTIER [id number] N/A 1 Implanted   SCR PDCL RESET SLD 7.0X55MM -- GEN 3 - [id number]  SCR PDCL RESET SLD 7.0X55MM -- GEN 3  SPINEFRONTIER [id number] N/A 4 Implanted   SCR PDCL RESET SLD 7.0X50MM -- GEN 3 - [id number]  SCR PDCL RESET SLD 7.0X50MM -- GEN 3  SPINEFRONTIER CF10 N/A 4 Implanted   SCR PDCL SLD PEDFUSE 8.0X45MM --  - [id number]  SCR PDCL SLD PEDFUSE 8.0X45MM --   SPINEFRONTIER [id number] N/A 2 Implanted   ABENA SP LORDTC PEDFUSE 5.5X120 --  - [id number]  ABENA SP LORDTC PEDFUSE 5.5X120 --   SPINEFRONTIER CF24 N/A 1 Implanted   SCR SET PEDFUSE 8-9MM --  - [id number]  SCR SET PEDFUSE 8-9MM --   SPINEFRONTIER [id number] N/A 2 Implanted   ABENA SP LORDTC PEDFUSE 5.5X130 --  - [id number]  ABENA SP LORDTC PEDFUSE 5.5X130 --   SPINEFRONTIER [id number] N/A 1 Implanted   SCR SET PEDFUSE 5-7MM --  - [id number]  SCR SET PEDFUSE 5-7MM --   SPINEFRONTIER [id number] N/A 8 Implanted   SCR SPNE ADJ CRV 45-65MM -- PEDFUSE - [id number]   SCR SPNE ADJ CRV 45-65MM -- PEDFUSE   SPINEFRONTIER CD20 N/A 1 Implanted         Indications: This is a 74y.o. year-old female who presents with significant back   and bilateral lower extremity pain, worsening ability to do activities of daily living. X-rays and MRI scan revealing severe spinal stenosis, degenerative disk disease and disk herniation. Having failed conservative care, he comes for operative intervention. DESCRIPTION OF PROCEDURE: After correct identification, the patient was   taken to the operating room, placed supine on the table, general   endotracheal anesthesia induced. 2grams of Ancef was given. [facility] Lumbar spine was prepped and draped in the usual sterile fashion. Midline incision was made in the lumbar spine, taken down to the spinous processes of L2-S1. The posterior transverse processes bilaterally were expose at L2-S1 as seen on intraoperative fluoroscopy. Gelpi retractors were then placed. The wound was then irrigated. Complete wide laminectomy was performed at L3, L4, as well as the inferior   half of L2 bilaterally and a revision laminectomy of the superior half of L5 bilaterally. Removal of more than 1/2 of the medial facets bilaterally allowed excellent midline decompression. Foraminotomies which included undercutting the pars intra-articularis were then performed bilaterally at L2-5 until a Penfield 3 was easily passed through each of the neural foramen. This allowed visualization of the L2, L3, L4 and L5 nerve roots. The wound   was then irrigated. Bur was then used to open the posterior aspect of the   pedicles bilaterally at L2-4. A gearshift probe was then placed through   each of these posterior bur holes, through the pedicle, into vertebral body   under intraoperative fluoroscopy. Ball-tipped probe was then placed through   each gearshift tracts, ensuring the gearshift had only gone through the bone. Pedicle screws from [employer] spinal system were then placed bilaterally at L2-4.   Previous rods and screws were removed from the L5 and S1 levels bilaterally. Rods were then fixed to the pedicle screws using the locking caps. Each of these caps were then torqued into position. Intraoperative x-ray revealed excellent alignment of the  hardware and anatomy. Wound was then irrigated with 1000cc of pulse lavage irrigation containing Bacitracin. Bur was then used to open the posterior aspect of the facet joints and transverse process bilaterally as well as removing of the cartilage surface of the facet joints. Bone graft that was taken from the laminectomy site was then placed in the   posterolateral gutters as well as in facet joints. Drain was then placed. Fascia was then closed using #1 Vicryl suture. Subcutaneous tissue   approximated with 2-0 Vicryl suture. Skin approximated with staples. Sterile dressing was applied. The patient tolerated the procedure well and taken to Recovery room in good   condition.

## 2017-08-29 NOTE — ROUTINE PROCESS
1957  Bedside and Verbal shift change report given to Brandie Mohamud RN (oncoming nurse) by Alesia Venegas RN (offgoing nurse). Report included the following information SBAR, Kardex, MAR and Recent Results.

## 2017-09-23 ENCOUNTER — APPOINTMENT (OUTPATIENT)
Dept: GENERAL RADIOLOGY | Age: 72
DRG: 470 | End: 2017-09-23
Attending: INTERNAL MEDICINE
Payer: MEDICARE

## 2017-09-23 ENCOUNTER — HOSPITAL ENCOUNTER (INPATIENT)
Age: 72
LOS: 4 days | Discharge: NURSING, REHAB OR LONG-TERM CARE | DRG: 470 | End: 2017-09-27
Attending: ORTHOPAEDIC SURGERY | Admitting: ORTHOPAEDIC SURGERY
Payer: MEDICARE
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
## 2017-09-23 PROBLEM — E78.5 HLD (HYPERLIPIDEMIA): Status: ACTIVE | Noted: 2017-09-23

## 2017-09-23 PROBLEM — I10 HTN (HYPERTENSION): Status: ACTIVE | Noted: 2017-09-23

## 2017-09-23 PROBLEM — S72.009A HIP FRACTURE REQUIRING OPERATIVE REPAIR (HCC): Status: ACTIVE | Noted: 2017-09-23

## 2017-09-23 PROBLEM — N39.0 UTI (URINARY TRACT INFECTION): Status: ACTIVE | Noted: 2017-09-23

## 2017-09-23 LAB
ALBUMIN SERPL-MCNC: 2.7 G/DL (ref 3.4–5)
ALBUMIN/GLOB SERPL: 0.7 {RATIO} (ref 0.8–1.7)
ALP SERPL-CCNC: 100 U/L (ref 45–117)
ALT SERPL-CCNC: 16 U/L (ref 13–56)
ANION GAP SERPL CALC-SCNC: 9 MMOL/L (ref 3–18)
AST SERPL-CCNC: 39 U/L (ref 15–37)
BASOPHILS # BLD: 0 K/UL (ref 0–0.06)
BASOPHILS NFR BLD: 1 % (ref 0–2)
BILIRUB SERPL-MCNC: 0.3 MG/DL (ref 0.2–1)
BUN SERPL-MCNC: 10 MG/DL (ref 7–18)
BUN/CREAT SERPL: 11 (ref 12–20)
CALCIUM SERPL-MCNC: 8 MG/DL (ref 8.5–10.1)
CHLORIDE SERPL-SCNC: 102 MMOL/L (ref 100–108)
CO2 SERPL-SCNC: 27 MMOL/L (ref 21–32)
CREAT SERPL-MCNC: 0.95 MG/DL (ref 0.6–1.3)
DIFFERENTIAL METHOD BLD: ABNORMAL
EOSINOPHIL # BLD: 0.1 K/UL (ref 0–0.4)
EOSINOPHIL NFR BLD: 1 % (ref 0–5)
ERYTHROCYTE [DISTWIDTH] IN BLOOD BY AUTOMATED COUNT: 14.2 % (ref 11.6–14.5)
GLOBULIN SER CALC-MCNC: 3.9 G/DL (ref 2–4)
GLUCOSE SERPL-MCNC: 126 MG/DL (ref 74–99)
HCT VFR BLD AUTO: 24.7 % (ref 35–45)
HGB BLD-MCNC: 8 G/DL (ref 12–16)
INR PPP: 1.1 (ref 0.8–1.2)
LYMPHOCYTES # BLD: 0.9 K/UL (ref 0.9–3.6)
LYMPHOCYTES NFR BLD: 12 % (ref 21–52)
MCH RBC QN AUTO: 30 PG (ref 24–34)
MCHC RBC AUTO-ENTMCNC: 32.4 G/DL (ref 31–37)
MCV RBC AUTO: 92.5 FL (ref 74–97)
MONOCYTES # BLD: 0.5 K/UL (ref 0.05–1.2)
MONOCYTES NFR BLD: 6 % (ref 3–10)
NEUTS SEG # BLD: 6.3 K/UL (ref 1.8–8)
NEUTS SEG NFR BLD: 80 % (ref 40–73)
PLATELET # BLD AUTO: 280 K/UL (ref 135–420)
PMV BLD AUTO: 9.1 FL (ref 9.2–11.8)
POTASSIUM SERPL-SCNC: 3.2 MMOL/L (ref 3.5–5.5)
PROT SERPL-MCNC: 6.6 G/DL (ref 6.4–8.2)
PROTHROMBIN TIME: 13.5 SEC (ref 11.5–15.2)
RBC # BLD AUTO: 2.67 M/UL (ref 4.2–5.3)
SODIUM SERPL-SCNC: 138 MMOL/L (ref 136–145)
WBC # BLD AUTO: 7.7 K/UL (ref 4.6–13.2)

## 2017-09-23 PROCEDURE — 93005 ELECTROCARDIOGRAM TRACING: CPT

## 2017-09-23 PROCEDURE — 74011250637 HC RX REV CODE- 250/637: Performed by: INTERNAL MEDICINE

## 2017-09-23 PROCEDURE — 85025 COMPLETE CBC W/AUTO DIFF WBC: CPT | Performed by: ORTHOPAEDIC SURGERY

## 2017-09-23 PROCEDURE — 0SRR03Z REPLACEMENT OF RIGHT HIP JOINT, FEMORAL SURFACE WITH CERAMIC SYNTHETIC SUBSTITUTE, OPEN APPROACH: ICD-10-PCS | Performed by: ORTHOPAEDIC SURGERY

## 2017-09-23 PROCEDURE — 71010 XR CHEST PORT: CPT

## 2017-09-23 PROCEDURE — 86900 BLOOD TYPING SEROLOGIC ABO: CPT | Performed by: ORTHOPAEDIC SURGERY

## 2017-09-23 PROCEDURE — 87086 URINE CULTURE/COLONY COUNT: CPT | Performed by: INTERNAL MEDICINE

## 2017-09-23 PROCEDURE — 74011250636 HC RX REV CODE- 250/636: Performed by: INTERNAL MEDICINE

## 2017-09-23 PROCEDURE — 65270000029 HC RM PRIVATE

## 2017-09-23 PROCEDURE — 74011250636 HC RX REV CODE- 250/636: Performed by: ORTHOPAEDIC SURGERY

## 2017-09-23 PROCEDURE — 36415 COLL VENOUS BLD VENIPUNCTURE: CPT | Performed by: ORTHOPAEDIC SURGERY

## 2017-09-23 PROCEDURE — 80053 COMPREHEN METABOLIC PANEL: CPT | Performed by: ORTHOPAEDIC SURGERY

## 2017-09-23 PROCEDURE — 8E0YXBF COMPUTER ASSISTED PROCEDURE OF LOWER EXTREMITY, WITH FLUOROSCOPY: ICD-10-PCS | Performed by: ORTHOPAEDIC SURGERY

## 2017-09-23 PROCEDURE — 81001 URINALYSIS AUTO W/SCOPE: CPT | Performed by: ORTHOPAEDIC SURGERY

## 2017-09-23 PROCEDURE — 86920 COMPATIBILITY TEST SPIN: CPT | Performed by: ORTHOPAEDIC SURGERY

## 2017-09-23 PROCEDURE — 85610 PROTHROMBIN TIME: CPT | Performed by: ORTHOPAEDIC SURGERY

## 2017-09-23 RX ORDER — SODIUM CHLORIDE 0.9 % (FLUSH) 0.9 %
5-10 SYRINGE (ML) INJECTION AS NEEDED
Status: DISCONTINUED | OUTPATIENT
Start: 2017-09-23 | End: 2017-09-27 | Stop reason: HOSPADM

## 2017-09-23 RX ORDER — SODIUM CHLORIDE 0.9 % (FLUSH) 0.9 %
5-10 SYRINGE (ML) INJECTION AS NEEDED
Status: DISCONTINUED | OUTPATIENT
Start: 2017-09-23 | End: 2017-09-24 | Stop reason: HOSPADM

## 2017-09-23 RX ORDER — SODIUM CHLORIDE 9 MG/ML
100 INJECTION, SOLUTION INTRAVENOUS CONTINUOUS
Status: DISCONTINUED | OUTPATIENT
Start: 2017-09-23 | End: 2017-09-25

## 2017-09-23 RX ORDER — ACETAMINOPHEN 325 MG/1
650 TABLET ORAL ONCE
Status: COMPLETED | OUTPATIENT
Start: 2017-09-23 | End: 2017-09-23

## 2017-09-23 RX ORDER — HYDROMORPHONE HYDROCHLORIDE 2 MG/ML
1 INJECTION, SOLUTION INTRAMUSCULAR; INTRAVENOUS; SUBCUTANEOUS
Status: COMPLETED | OUTPATIENT
Start: 2017-09-23 | End: 2017-09-23

## 2017-09-23 RX ORDER — SODIUM CHLORIDE 0.9 % (FLUSH) 0.9 %
5-10 SYRINGE (ML) INJECTION EVERY 8 HOURS
Status: DISCONTINUED | OUTPATIENT
Start: 2017-09-23 | End: 2017-09-24 | Stop reason: HOSPADM

## 2017-09-23 RX ORDER — DIPHENHYDRAMINE HYDROCHLORIDE 50 MG/ML
12.5 INJECTION, SOLUTION INTRAMUSCULAR; INTRAVENOUS
Status: DISCONTINUED | OUTPATIENT
Start: 2017-09-23 | End: 2017-09-27 | Stop reason: HOSPADM

## 2017-09-23 RX ORDER — NALOXONE HYDROCHLORIDE 0.4 MG/ML
0.1 INJECTION, SOLUTION INTRAMUSCULAR; INTRAVENOUS; SUBCUTANEOUS AS NEEDED
Status: DISCONTINUED | OUTPATIENT
Start: 2017-09-23 | End: 2017-09-27 | Stop reason: HOSPADM

## 2017-09-23 RX ORDER — SODIUM CHLORIDE 0.9 % (FLUSH) 0.9 %
5-10 SYRINGE (ML) INJECTION EVERY 8 HOURS
Status: DISCONTINUED | OUTPATIENT
Start: 2017-09-23 | End: 2017-09-26

## 2017-09-23 RX ORDER — NALOXONE HYDROCHLORIDE 0.4 MG/ML
0.04 INJECTION, SOLUTION INTRAMUSCULAR; INTRAVENOUS; SUBCUTANEOUS
Status: DISCONTINUED | OUTPATIENT
Start: 2017-09-23 | End: 2017-09-27 | Stop reason: HOSPADM

## 2017-09-23 RX ADMIN — Medication 10 ML: at 23:09

## 2017-09-23 RX ADMIN — HYDROMORPHONE HYDROCHLORIDE 1 MG: 2 INJECTION, SOLUTION INTRAMUSCULAR; INTRAVENOUS; SUBCUTANEOUS at 23:09

## 2017-09-23 RX ADMIN — ACETAMINOPHEN 650 MG: 325 TABLET ORAL at 23:08

## 2017-09-23 RX ADMIN — SODIUM CHLORIDE 75 ML/HR: 900 INJECTION, SOLUTION INTRAVENOUS at 23:08

## 2017-09-23 RX ADMIN — HYDROMORPHONE HYDROCHLORIDE: 10 INJECTION, SOLUTION INTRAMUSCULAR; INTRAVENOUS; SUBCUTANEOUS at 23:30

## 2017-09-24 ENCOUNTER — ANESTHESIA EVENT (OUTPATIENT)
Dept: SURGERY | Age: 72
DRG: 470 | End: 2017-09-24
Payer: MEDICARE

## 2017-09-24 ENCOUNTER — APPOINTMENT (OUTPATIENT)
Dept: GENERAL RADIOLOGY | Age: 72
DRG: 470 | End: 2017-09-24
Attending: ORTHOPAEDIC SURGERY
Payer: MEDICARE

## 2017-09-24 ENCOUNTER — ANESTHESIA (OUTPATIENT)
Dept: SURGERY | Age: 72
DRG: 470 | End: 2017-09-24
Payer: MEDICARE

## 2017-09-24 PROBLEM — S72.001A FRACTURE OF NECK OF RIGHT FEMUR (HCC): Status: ACTIVE | Noted: 2017-09-24

## 2017-09-24 PROBLEM — E87.6 HYPOKALEMIA: Status: ACTIVE | Noted: 2017-09-24

## 2017-09-24 LAB
APPEARANCE UR: CLEAR
ATRIAL RATE: 97 BPM
BACTERIA URNS QL MICRO: NEGATIVE /HPF
BILIRUB UR QL: NEGATIVE
CALCULATED P AXIS, ECG09: 40 DEGREES
CALCULATED R AXIS, ECG10: 56 DEGREES
CALCULATED T AXIS, ECG11: 58 DEGREES
COLOR UR: YELLOW
DIAGNOSIS, 93000: NORMAL
EPITH CASTS URNS QL MICRO: NORMAL /LPF (ref 0–5)
GLUCOSE UR STRIP.AUTO-MCNC: NEGATIVE MG/DL
HCT VFR BLD AUTO: 26 % (ref 35–45)
HGB BLD-MCNC: 8.6 G/DL (ref 12–16)
HGB UR QL STRIP: ABNORMAL
KETONES UR QL STRIP.AUTO: NEGATIVE MG/DL
LEUKOCYTE ESTERASE UR QL STRIP.AUTO: ABNORMAL
NITRITE UR QL STRIP.AUTO: NEGATIVE
P-R INTERVAL, ECG05: 130 MS
PH UR STRIP: 6.5 [PH] (ref 5–8)
PROT UR STRIP-MCNC: NEGATIVE MG/DL
Q-T INTERVAL, ECG07: 356 MS
QRS DURATION, ECG06: 86 MS
QTC CALCULATION (BEZET), ECG08: 452 MS
RBC #/AREA URNS HPF: NORMAL /HPF (ref 0–5)
SP GR UR REFRACTOMETRY: 1.01 (ref 1–1.03)
UROBILINOGEN UR QL STRIP.AUTO: 0.2 EU/DL (ref 0.2–1)
VENTRICULAR RATE, ECG03: 97 BPM
WBC URNS QL MICRO: NORMAL /HPF (ref 0–5)

## 2017-09-24 PROCEDURE — 73501 X-RAY EXAM HIP UNI 1 VIEW: CPT

## 2017-09-24 PROCEDURE — 76210000016 HC OR PH I REC 1 TO 1.5 HR: Performed by: ORTHOPAEDIC SURGERY

## 2017-09-24 PROCEDURE — 74011250636 HC RX REV CODE- 250/636

## 2017-09-24 PROCEDURE — 65270000029 HC RM PRIVATE

## 2017-09-24 PROCEDURE — 77030037875 HC DRSG MEPILEX <16IN BORD MOLN -A: Performed by: ORTHOPAEDIC SURGERY

## 2017-09-24 PROCEDURE — 76010000153 HC OR TIME 1.5 TO 2 HR: Performed by: ORTHOPAEDIC SURGERY

## 2017-09-24 PROCEDURE — 85018 HEMOGLOBIN: CPT | Performed by: ORTHOPAEDIC SURGERY

## 2017-09-24 PROCEDURE — 77030020407 HC IV BLD WRMR ST 3M -A: Performed by: NURSE ANESTHETIST, CERTIFIED REGISTERED

## 2017-09-24 PROCEDURE — 88311 DECALCIFY TISSUE: CPT | Performed by: ORTHOPAEDIC SURGERY

## 2017-09-24 PROCEDURE — 74011250637 HC RX REV CODE- 250/637: Performed by: ORTHOPAEDIC SURGERY

## 2017-09-24 PROCEDURE — 74011250636 HC RX REV CODE- 250/636: Performed by: ANESTHESIOLOGY

## 2017-09-24 PROCEDURE — 97116 GAIT TRAINING THERAPY: CPT

## 2017-09-24 PROCEDURE — 77030031139 HC SUT VCRL2 J&J -A: Performed by: ORTHOPAEDIC SURGERY

## 2017-09-24 PROCEDURE — 77030018836 HC SOL IRR NACL ICUM -A: Performed by: ORTHOPAEDIC SURGERY

## 2017-09-24 PROCEDURE — 74011000258 HC RX REV CODE- 258: Performed by: INTERNAL MEDICINE

## 2017-09-24 PROCEDURE — 74011250636 HC RX REV CODE- 250/636: Performed by: HOSPITALIST

## 2017-09-24 PROCEDURE — 77030006643: Performed by: NURSE ANESTHETIST, CERTIFIED REGISTERED

## 2017-09-24 PROCEDURE — 88307 TISSUE EXAM BY PATHOLOGIST: CPT | Performed by: ORTHOPAEDIC SURGERY

## 2017-09-24 PROCEDURE — 77030032490 HC SLV COMPR SCD KNE COVD -B: Performed by: ORTHOPAEDIC SURGERY

## 2017-09-24 PROCEDURE — 97162 PT EVAL MOD COMPLEX 30 MIN: CPT

## 2017-09-24 PROCEDURE — 77030034475 HC MISC IMPL SPN: Performed by: ORTHOPAEDIC SURGERY

## 2017-09-24 PROCEDURE — 77030008477 HC STYL SATN SLP COVD -A: Performed by: NURSE ANESTHETIST, CERTIFIED REGISTERED

## 2017-09-24 PROCEDURE — 77030013079 HC BLNKT BAIR HGGR 3M -A: Performed by: NURSE ANESTHETIST, CERTIFIED REGISTERED

## 2017-09-24 PROCEDURE — 36415 COLL VENOUS BLD VENIPUNCTURE: CPT | Performed by: INTERNAL MEDICINE

## 2017-09-24 PROCEDURE — 77030013708 HC HNDPC SUC IRR PULS STRY –B: Performed by: ORTHOPAEDIC SURGERY

## 2017-09-24 PROCEDURE — 77030034479 HC ADH SKN CLSR PRINEO J&J -B: Performed by: ORTHOPAEDIC SURGERY

## 2017-09-24 PROCEDURE — 77030003666 HC NDL SPINAL BD -A: Performed by: ORTHOPAEDIC SURGERY

## 2017-09-24 PROCEDURE — 74011250637 HC RX REV CODE- 250/637: Performed by: INTERNAL MEDICINE

## 2017-09-24 PROCEDURE — 77010033678 HC OXYGEN DAILY

## 2017-09-24 PROCEDURE — 87040 BLOOD CULTURE FOR BACTERIA: CPT | Performed by: INTERNAL MEDICINE

## 2017-09-24 PROCEDURE — 76060000034 HC ANESTHESIA 1.5 TO 2 HR: Performed by: ORTHOPAEDIC SURGERY

## 2017-09-24 PROCEDURE — C9290 INJ, BUPIVACAINE LIPOSOME: HCPCS | Performed by: ORTHOPAEDIC SURGERY

## 2017-09-24 PROCEDURE — 77030031140 HC SUT VCRL3 J&J -A: Performed by: ORTHOPAEDIC SURGERY

## 2017-09-24 PROCEDURE — 74011000258 HC RX REV CODE- 258: Performed by: ORTHOPAEDIC SURGERY

## 2017-09-24 PROCEDURE — 74011000250 HC RX REV CODE- 250

## 2017-09-24 PROCEDURE — 77030029099 HC BN WAX SSPC -A: Performed by: ORTHOPAEDIC SURGERY

## 2017-09-24 PROCEDURE — 74011000250 HC RX REV CODE- 250: Performed by: ORTHOPAEDIC SURGERY

## 2017-09-24 PROCEDURE — 77030008683 HC TU ET CUF COVD -A: Performed by: NURSE ANESTHETIST, CERTIFIED REGISTERED

## 2017-09-24 PROCEDURE — 74011250636 HC RX REV CODE- 250/636: Performed by: ORTHOPAEDIC SURGERY

## 2017-09-24 PROCEDURE — 74011250636 HC RX REV CODE- 250/636: Performed by: INTERNAL MEDICINE

## 2017-09-24 PROCEDURE — C1776 JOINT DEVICE (IMPLANTABLE): HCPCS | Performed by: ORTHOPAEDIC SURGERY

## 2017-09-24 PROCEDURE — 77030038010: Performed by: ORTHOPAEDIC SURGERY

## 2017-09-24 PROCEDURE — 74011250637 HC RX REV CODE- 250/637: Performed by: HOSPITALIST

## 2017-09-24 PROCEDURE — 77030011640 HC PAD GRND REM COVD -A: Performed by: ORTHOPAEDIC SURGERY

## 2017-09-24 PROCEDURE — 77030018846 HC SOL IRR STRL H20 ICUM -A: Performed by: ORTHOPAEDIC SURGERY

## 2017-09-24 DEVICE — SPACER FEM -3MM OFFSET HIP 12/14 ARTICULEZE TAPR UPLR MOD: Type: IMPLANTABLE DEVICE | Site: HIP | Status: FUNCTIONAL

## 2017-09-24 DEVICE — STEM FEMORAL SUMMIT: Type: IMPLANTABLE DEVICE | Site: HIP | Status: FUNCTIONAL

## 2017-09-24 DEVICE — HEAD UPLR DIA45MM HIP BALL MOD CATHCART SELF CNTR: Type: IMPLANTABLE DEVICE | Site: HIP | Status: FUNCTIONAL

## 2017-09-24 RX ORDER — ONDANSETRON 2 MG/ML
INJECTION INTRAMUSCULAR; INTRAVENOUS AS NEEDED
Status: DISCONTINUED | OUTPATIENT
Start: 2017-09-24 | End: 2017-09-24 | Stop reason: HOSPADM

## 2017-09-24 RX ORDER — SODIUM CHLORIDE, SODIUM LACTATE, POTASSIUM CHLORIDE, CALCIUM CHLORIDE 600; 310; 30; 20 MG/100ML; MG/100ML; MG/100ML; MG/100ML
100 INJECTION, SOLUTION INTRAVENOUS CONTINUOUS
Status: DISCONTINUED | OUTPATIENT
Start: 2017-09-24 | End: 2017-09-24 | Stop reason: HOSPADM

## 2017-09-24 RX ORDER — OXYCODONE HYDROCHLORIDE 5 MG/1
10 TABLET ORAL
Status: DISCONTINUED | OUTPATIENT
Start: 2017-09-24 | End: 2017-09-27

## 2017-09-24 RX ORDER — FENTANYL CITRATE 50 UG/ML
INJECTION, SOLUTION INTRAMUSCULAR; INTRAVENOUS AS NEEDED
Status: DISCONTINUED | OUTPATIENT
Start: 2017-09-24 | End: 2017-09-24 | Stop reason: HOSPADM

## 2017-09-24 RX ORDER — SODIUM CHLORIDE, SODIUM LACTATE, POTASSIUM CHLORIDE, CALCIUM CHLORIDE 600; 310; 30; 20 MG/100ML; MG/100ML; MG/100ML; MG/100ML
INJECTION, SOLUTION INTRAVENOUS
Status: DISCONTINUED | OUTPATIENT
Start: 2017-09-24 | End: 2017-09-24 | Stop reason: HOSPADM

## 2017-09-24 RX ORDER — ENOXAPARIN SODIUM 100 MG/ML
40 INJECTION SUBCUTANEOUS DAILY
Status: DISCONTINUED | OUTPATIENT
Start: 2017-09-25 | End: 2017-09-27 | Stop reason: HOSPADM

## 2017-09-24 RX ORDER — HYDROMORPHONE HYDROCHLORIDE 2 MG/ML
1 INJECTION, SOLUTION INTRAMUSCULAR; INTRAVENOUS; SUBCUTANEOUS
Status: DISCONTINUED | OUTPATIENT
Start: 2017-09-24 | End: 2017-09-27 | Stop reason: HOSPADM

## 2017-09-24 RX ORDER — SODIUM CHLORIDE 0.9 % (FLUSH) 0.9 %
5-10 SYRINGE (ML) INJECTION AS NEEDED
Status: DISCONTINUED | OUTPATIENT
Start: 2017-09-24 | End: 2017-09-27 | Stop reason: HOSPADM

## 2017-09-24 RX ORDER — SODIUM CHLORIDE 0.9 % (FLUSH) 0.9 %
5-10 SYRINGE (ML) INJECTION EVERY 8 HOURS
Status: DISCONTINUED | OUTPATIENT
Start: 2017-09-24 | End: 2017-09-27 | Stop reason: HOSPADM

## 2017-09-24 RX ORDER — SODIUM CHLORIDE 9 MG/ML
999 INJECTION, SOLUTION INTRAVENOUS ONCE
Status: COMPLETED | OUTPATIENT
Start: 2017-09-24 | End: 2017-09-24

## 2017-09-24 RX ORDER — POTASSIUM CHLORIDE 7.45 MG/ML
10 INJECTION INTRAVENOUS
Status: DISCONTINUED | OUTPATIENT
Start: 2017-09-24 | End: 2017-09-24 | Stop reason: SDUPTHER

## 2017-09-24 RX ORDER — AMLODIPINE BESYLATE 5 MG/1
10 TABLET ORAL DAILY
Status: DISCONTINUED | OUTPATIENT
Start: 2017-09-24 | End: 2017-09-27 | Stop reason: HOSPADM

## 2017-09-24 RX ORDER — NEOSTIGMINE METHYLSULFATE 5 MG/5 ML
SYRINGE (ML) INTRAVENOUS AS NEEDED
Status: DISCONTINUED | OUTPATIENT
Start: 2017-09-24 | End: 2017-09-24 | Stop reason: HOSPADM

## 2017-09-24 RX ORDER — ONDANSETRON 2 MG/ML
4 INJECTION INTRAMUSCULAR; INTRAVENOUS ONCE
Status: DISCONTINUED | OUTPATIENT
Start: 2017-09-24 | End: 2017-09-24 | Stop reason: HOSPADM

## 2017-09-24 RX ORDER — CEFAZOLIN SODIUM IN 0.9 % NACL 2 G/100 ML
PLASTIC BAG, INJECTION (ML) INTRAVENOUS AS NEEDED
Status: DISCONTINUED | OUTPATIENT
Start: 2017-09-24 | End: 2017-09-24 | Stop reason: HOSPADM

## 2017-09-24 RX ORDER — ACETAMINOPHEN 325 MG/1
650 TABLET ORAL ONCE
Status: COMPLETED | OUTPATIENT
Start: 2017-09-24 | End: 2017-09-24

## 2017-09-24 RX ORDER — FENTANYL CITRATE 50 UG/ML
50 INJECTION, SOLUTION INTRAMUSCULAR; INTRAVENOUS
Status: DISCONTINUED | OUTPATIENT
Start: 2017-09-24 | End: 2017-09-24 | Stop reason: HOSPADM

## 2017-09-24 RX ORDER — CEFAZOLIN SODIUM 2 G/50ML
2 SOLUTION INTRAVENOUS EVERY 8 HOURS
Status: COMPLETED | OUTPATIENT
Start: 2017-09-24 | End: 2017-09-25

## 2017-09-24 RX ORDER — DOCUSATE SODIUM 100 MG/1
100 CAPSULE, LIQUID FILLED ORAL 2 TIMES DAILY
Status: DISCONTINUED | OUTPATIENT
Start: 2017-09-24 | End: 2017-09-27 | Stop reason: HOSPADM

## 2017-09-24 RX ORDER — ROCURONIUM BROMIDE 10 MG/ML
INJECTION, SOLUTION INTRAVENOUS AS NEEDED
Status: DISCONTINUED | OUTPATIENT
Start: 2017-09-24 | End: 2017-09-24 | Stop reason: HOSPADM

## 2017-09-24 RX ORDER — METOCLOPRAMIDE HYDROCHLORIDE 5 MG/ML
INJECTION INTRAMUSCULAR; INTRAVENOUS AS NEEDED
Status: DISCONTINUED | OUTPATIENT
Start: 2017-09-24 | End: 2017-09-24 | Stop reason: HOSPADM

## 2017-09-24 RX ORDER — DEXAMETHASONE SODIUM PHOSPHATE 4 MG/ML
INJECTION, SOLUTION INTRA-ARTICULAR; INTRALESIONAL; INTRAMUSCULAR; INTRAVENOUS; SOFT TISSUE AS NEEDED
Status: DISCONTINUED | OUTPATIENT
Start: 2017-09-24 | End: 2017-09-24 | Stop reason: HOSPADM

## 2017-09-24 RX ORDER — FLUMAZENIL 0.1 MG/ML
0.2 INJECTION INTRAVENOUS
Status: DISCONTINUED | OUTPATIENT
Start: 2017-09-24 | End: 2017-09-24 | Stop reason: HOSPADM

## 2017-09-24 RX ORDER — ACETAMINOPHEN 325 MG/1
650 TABLET ORAL
Status: DISCONTINUED | OUTPATIENT
Start: 2017-09-24 | End: 2017-09-27 | Stop reason: HOSPADM

## 2017-09-24 RX ORDER — POTASSIUM CHLORIDE 7.45 MG/ML
10 INJECTION INTRAVENOUS
Status: COMPLETED | OUTPATIENT
Start: 2017-09-24 | End: 2017-09-24

## 2017-09-24 RX ORDER — SODIUM CHLORIDE 9 MG/ML
INJECTION, SOLUTION INTRAVENOUS
Status: DISCONTINUED | OUTPATIENT
Start: 2017-09-24 | End: 2017-09-24 | Stop reason: HOSPADM

## 2017-09-24 RX ORDER — NALOXONE HYDROCHLORIDE 0.4 MG/ML
0.4 INJECTION, SOLUTION INTRAMUSCULAR; INTRAVENOUS; SUBCUTANEOUS AS NEEDED
Status: DISCONTINUED | OUTPATIENT
Start: 2017-09-24 | End: 2017-09-24 | Stop reason: HOSPADM

## 2017-09-24 RX ORDER — ATORVASTATIN CALCIUM 20 MG/1
20 TABLET, FILM COATED ORAL DAILY
Status: DISCONTINUED | OUTPATIENT
Start: 2017-09-24 | End: 2017-09-27 | Stop reason: HOSPADM

## 2017-09-24 RX ORDER — HYDROMORPHONE HYDROCHLORIDE 2 MG/ML
0.5 INJECTION, SOLUTION INTRAMUSCULAR; INTRAVENOUS; SUBCUTANEOUS
Status: DISCONTINUED | OUTPATIENT
Start: 2017-09-24 | End: 2017-09-24 | Stop reason: HOSPADM

## 2017-09-24 RX ORDER — LANOLIN ALCOHOL/MO/W.PET/CERES
1 CREAM (GRAM) TOPICAL 2 TIMES DAILY WITH MEALS
Status: DISCONTINUED | OUTPATIENT
Start: 2017-09-24 | End: 2017-09-27 | Stop reason: HOSPADM

## 2017-09-24 RX ORDER — GLYCOPYRROLATE 0.2 MG/ML
INJECTION INTRAMUSCULAR; INTRAVENOUS AS NEEDED
Status: DISCONTINUED | OUTPATIENT
Start: 2017-09-24 | End: 2017-09-24 | Stop reason: HOSPADM

## 2017-09-24 RX ORDER — SERTRALINE HYDROCHLORIDE 50 MG/1
100 TABLET, FILM COATED ORAL DAILY
Status: DISCONTINUED | OUTPATIENT
Start: 2017-09-24 | End: 2017-09-27 | Stop reason: HOSPADM

## 2017-09-24 RX ORDER — LIDOCAINE HYDROCHLORIDE 20 MG/ML
INJECTION, SOLUTION EPIDURAL; INFILTRATION; INTRACAUDAL; PERINEURAL AS NEEDED
Status: DISCONTINUED | OUTPATIENT
Start: 2017-09-24 | End: 2017-09-24 | Stop reason: HOSPADM

## 2017-09-24 RX ORDER — MIDAZOLAM HYDROCHLORIDE 1 MG/ML
INJECTION, SOLUTION INTRAMUSCULAR; INTRAVENOUS AS NEEDED
Status: DISCONTINUED | OUTPATIENT
Start: 2017-09-24 | End: 2017-09-24 | Stop reason: HOSPADM

## 2017-09-24 RX ORDER — PROPOFOL 10 MG/ML
INJECTION, EMULSION INTRAVENOUS AS NEEDED
Status: DISCONTINUED | OUTPATIENT
Start: 2017-09-24 | End: 2017-09-24 | Stop reason: HOSPADM

## 2017-09-24 RX ORDER — LABETALOL HCL 20 MG/4 ML
SYRINGE (ML) INTRAVENOUS AS NEEDED
Status: DISCONTINUED | OUTPATIENT
Start: 2017-09-24 | End: 2017-09-24 | Stop reason: HOSPADM

## 2017-09-24 RX ADMIN — POTASSIUM CHLORIDE 10 MEQ: 10 INJECTION, SOLUTION INTRAVENOUS at 15:18

## 2017-09-24 RX ADMIN — GLYCOPYRROLATE 0.4 MG: 0.2 INJECTION INTRAMUSCULAR; INTRAVENOUS at 11:07

## 2017-09-24 RX ADMIN — PROPOFOL 20 MG: 10 INJECTION, EMULSION INTRAVENOUS at 11:01

## 2017-09-24 RX ADMIN — MIDAZOLAM HYDROCHLORIDE 1 MG: 1 INJECTION, SOLUTION INTRAMUSCULAR; INTRAVENOUS at 09:28

## 2017-09-24 RX ADMIN — PROPOFOL 100 MG: 10 INJECTION, EMULSION INTRAVENOUS at 09:41

## 2017-09-24 RX ADMIN — Medication 2.5 MG: at 10:33

## 2017-09-24 RX ADMIN — FENTANYL CITRATE 50 MCG: 50 INJECTION, SOLUTION INTRAMUSCULAR; INTRAVENOUS at 10:04

## 2017-09-24 RX ADMIN — ROCURONIUM BROMIDE 10 MG: 10 INJECTION, SOLUTION INTRAVENOUS at 10:27

## 2017-09-24 RX ADMIN — FENTANYL CITRATE 25 MCG: 50 INJECTION, SOLUTION INTRAMUSCULAR; INTRAVENOUS at 11:19

## 2017-09-24 RX ADMIN — ACETAMINOPHEN 650 MG: 325 TABLET ORAL at 20:22

## 2017-09-24 RX ADMIN — OXYCODONE HYDROCHLORIDE 10 MG: 5 TABLET ORAL at 15:13

## 2017-09-24 RX ADMIN — SODIUM CHLORIDE, SODIUM LACTATE, POTASSIUM CHLORIDE, CALCIUM CHLORIDE: 600; 310; 30; 20 INJECTION, SOLUTION INTRAVENOUS at 10:40

## 2017-09-24 RX ADMIN — SODIUM CHLORIDE, SODIUM LACTATE, POTASSIUM CHLORIDE, CALCIUM CHLORIDE: 600; 310; 30; 20 INJECTION, SOLUTION INTRAVENOUS at 10:45

## 2017-09-24 RX ADMIN — ATORVASTATIN CALCIUM 20 MG: 20 TABLET, FILM COATED ORAL at 14:36

## 2017-09-24 RX ADMIN — SODIUM CHLORIDE, SODIUM LACTATE, POTASSIUM CHLORIDE, CALCIUM CHLORIDE: 600; 310; 30; 20 INJECTION, SOLUTION INTRAVENOUS at 09:55

## 2017-09-24 RX ADMIN — FENTANYL CITRATE 25 MCG: 50 INJECTION, SOLUTION INTRAMUSCULAR; INTRAVENOUS at 10:35

## 2017-09-24 RX ADMIN — SODIUM CHLORIDE 999 ML/HR: 900 INJECTION, SOLUTION INTRAVENOUS at 21:24

## 2017-09-24 RX ADMIN — SODIUM CHLORIDE: 9 INJECTION, SOLUTION INTRAVENOUS at 08:47

## 2017-09-24 RX ADMIN — SODIUM CHLORIDE, SODIUM LACTATE, POTASSIUM CHLORIDE, AND CALCIUM CHLORIDE 100 ML/HR: 600; 310; 30; 20 INJECTION, SOLUTION INTRAVENOUS at 11:59

## 2017-09-24 RX ADMIN — ROCURONIUM BROMIDE 40 MG: 10 INJECTION, SOLUTION INTRAVENOUS at 09:41

## 2017-09-24 RX ADMIN — DEXAMETHASONE SODIUM PHOSPHATE 4 MG: 4 INJECTION, SOLUTION INTRA-ARTICULAR; INTRALESIONAL; INTRAMUSCULAR; INTRAVENOUS; SOFT TISSUE at 10:16

## 2017-09-24 RX ADMIN — POTASSIUM CHLORIDE 10 MEQ: 10 INJECTION, SOLUTION INTRAVENOUS at 16:24

## 2017-09-24 RX ADMIN — Medication 3 MG: at 11:07

## 2017-09-24 RX ADMIN — PROPOFOL 20 MG: 10 INJECTION, EMULSION INTRAVENOUS at 11:10

## 2017-09-24 RX ADMIN — GLYCOPYRROLATE 0.1 MG: 0.2 INJECTION INTRAMUSCULAR; INTRAVENOUS at 09:28

## 2017-09-24 RX ADMIN — FENTANYL CITRATE 25 MCG: 50 INJECTION, SOLUTION INTRAMUSCULAR; INTRAVENOUS at 11:25

## 2017-09-24 RX ADMIN — METOCLOPRAMIDE HYDROCHLORIDE 10 MG: 5 INJECTION INTRAMUSCULAR; INTRAVENOUS at 09:28

## 2017-09-24 RX ADMIN — GLYCOPYRROLATE 0.1 MG: 0.2 INJECTION INTRAMUSCULAR; INTRAVENOUS at 09:34

## 2017-09-24 RX ADMIN — SODIUM CHLORIDE 75 ML/HR: 900 INJECTION, SOLUTION INTRAVENOUS at 14:44

## 2017-09-24 RX ADMIN — CEFTRIAXONE 1 G: 1 INJECTION, POWDER, FOR SOLUTION INTRAMUSCULAR; INTRAVENOUS at 18:35

## 2017-09-24 RX ADMIN — CEFAZOLIN SODIUM 2 G: 2 SOLUTION INTRAVENOUS at 23:55

## 2017-09-24 RX ADMIN — FENTANYL CITRATE 25 MCG: 50 INJECTION, SOLUTION INTRAMUSCULAR; INTRAVENOUS at 10:27

## 2017-09-24 RX ADMIN — Medication 2 G: at 09:48

## 2017-09-24 RX ADMIN — ACETAMINOPHEN 650 MG: 325 TABLET ORAL at 21:30

## 2017-09-24 RX ADMIN — SERTRALINE HYDROCHLORIDE 100 MG: 50 TABLET ORAL at 14:36

## 2017-09-24 RX ADMIN — PROPOFOL 20 MG: 10 INJECTION, EMULSION INTRAVENOUS at 11:06

## 2017-09-24 RX ADMIN — LIDOCAINE HYDROCHLORIDE 100 MG: 20 INJECTION, SOLUTION EPIDURAL; INFILTRATION; INTRACAUDAL; PERINEURAL at 09:34

## 2017-09-24 RX ADMIN — AMLODIPINE BESYLATE 10 MG: 5 TABLET ORAL at 14:36

## 2017-09-24 RX ADMIN — OXYCODONE HYDROCHLORIDE 10 MG: 5 TABLET ORAL at 18:33

## 2017-09-24 RX ADMIN — POTASSIUM CHLORIDE 10 MEQ: 10 INJECTION, SOLUTION INTRAVENOUS at 11:40

## 2017-09-24 RX ADMIN — HYDROMORPHONE HYDROCHLORIDE: 10 INJECTION, SOLUTION INTRAMUSCULAR; INTRAVENOUS; SUBCUTANEOUS at 07:54

## 2017-09-24 RX ADMIN — FENTANYL CITRATE 100 MCG: 50 INJECTION, SOLUTION INTRAMUSCULAR; INTRAVENOUS at 09:38

## 2017-09-24 RX ADMIN — FERROUS SULFATE TAB 325 MG (65 MG ELEMENTAL FE) 325 MG: 325 (65 FE) TAB at 18:33

## 2017-09-24 RX ADMIN — CEFAZOLIN SODIUM 2 G: 2 SOLUTION INTRAVENOUS at 15:17

## 2017-09-24 RX ADMIN — DOCUSATE SODIUM 100 MG: 100 CAPSULE, LIQUID FILLED ORAL at 20:22

## 2017-09-24 RX ADMIN — MIDAZOLAM HYDROCHLORIDE 1 MG: 1 INJECTION, SOLUTION INTRAMUSCULAR; INTRAVENOUS at 09:30

## 2017-09-24 RX ADMIN — ONDANSETRON 4 MG: 2 INJECTION INTRAMUSCULAR; INTRAVENOUS at 10:54

## 2017-09-24 NOTE — ROUTINE PROCESS
TRANSFER - IN REPORT:    Verbal report received from Bonifacio Ladd RN (name) on Kartik Bowling  being received from [facility] (ED) (unit) for routine progression of care      Report consisted of patients Situation, Background, Assessment and   Recommendations(SBAR). Information from the following report(s) SBAR, Kardex, ED Summary, Procedure Summary, Intake/Output, MAR, Recent Results and Med Rec Status was reviewed with the receiving nurse. Opportunity for questions and clarification was provided. Assessment completed upon patients arrival to unit and care assumed.

## 2017-09-24 NOTE — PROGRESS NOTES
Hospitalist Progress Note-critical care note     Patient: Mary Singh MRN: [id number]  CSN: [id number]    YOB: 1945  Age: 74 y.o. Sex: female    DOA: 9/23/2017 LOS:  LOS: 1 day            Chief complaint: hip fracture, htn, hypokalemia       Assessment/Plan         Hospital Problems  Date Reviewed: 9/24/2017          Codes Class Noted POA    Hip fracture requiring operative repair [facility]) ICD-10-CM: W76.836M  ICD-9-CM: 820.8  9/23/2017 Unknown        HTN (hypertension) ICD-10-CM: I10  ICD-9-CM: 401.9  9/23/2017 Unknown        HLD (hyperlipidemia) ICD-10-CM: E78.5  ICD-9-CM: 272.4  9/23/2017 Unknown        * (Principal)Fall ICD-10-CM: X26. [employer]  ICD-9-CM: E888.9  9/23/2017 Unknown        UTI (urinary tract infection) ICD-10-CM: N39.0  ICD-9-CM: 599.0  9/23/2017 Unknown        DDD (degenerative disc disease), lumbar ICD-10-CM: M51.36  ICD-9-CM: 722.52  8/22/2017 Yes        Status post lumbar surgery ICD-10-CM: Z98.890  ICD-9-CM: V45.89  8/22/2017 Yes        Malignant melanoma ([facility] [address] ICD-10-CM: C43.9  ICD-9-CM: 172.9  2/22/2016 Yes            1. Mechanical Fall causing right hip fracture   Will have surgery today   Managed per primary team   Will have pain control   2. UTI   afebrile now  Will continue rocephin  3. Leukocytosis, likely from UTI and reactive   Resolved   4. HTN  Continue home medication   5. HLD  On statin   6. Hx of Melanoma   7. Hx of Mastectomy due to breast cancer   8 hypokalemia   K replacement, will recheck mg   DNR    Subjective: feel fine, pain when I moved  Nurse; no acute issue   Review of systems:    General: No fevers or chills. Cardiovascular: No chest pain or pressure. No palpitations. Pulmonary: No shortness of breath. Gastrointestinal: No nausea, vomiting.    MSK: hip pain   Vital signs/Intake and Output:  Visit Vitals    /67    Pulse 86    Temp 99.1 °F (37.3 °C)    Resp 18    Wt 59.4 kg (131 lb)    SpO2 90%    BMI 23.21 kg/m2     Current Shift: Last three shifts:  09/22 1901 - 09/24 0700  In: 588.8 [I.V.:588.8]  Out: 1925 [Urine:1925]    Physical Exam:  General: WD, WN. Alert, cooperative, no acute distress    HEENT: NC, Atraumatic. PERRLA, anicteric sclerae. Lungs: CTA Bilaterally. No Wheezing/Rhonchi/Rales. Heart:  Regular  rhythm,  + murmur, No Rubs, No Gallops  Abdomen: Soft, Non distended, Non tender.  +Bowel sounds,   Extremities: No c/c/e  Psych:   Not anxious or agitated. Neurologic:  No acute neurological deficit. Labs: Results:       Chemistry Recent Labs      09/23/17 2315   GLU  126*   NA  138   K  3.2*   CL  102   CO2  27   BUN  10   CREA  0.95   CA  8.0*   AGAP  9   BUCR  11*   AP  100   TP  6.6   ALB  2.7*   GLOB  3.9   AGRAT  0.7*      CBC w/Diff Recent Labs      09/23/17 2315   WBC  7.7   RBC  2.67*   HGB  8.0*   HCT  24.7*   PLT  280   GRANS  80*   LYMPH  12*   EOS  1      Cardiac Enzymes No results for input(s): CPK, CKND1, JESSICA in the last 72 hours. No lab exists for component: CKRMB, TROIP   Coagulation Recent Labs      09/23/17 2315   PTP  13.5   INR  1.1       Lipid Panel No results found for: CHOL, CHOLPOCT, CHOLX, CHLST, CHOLV, 999066, HDL, LDL, LDLC, DLDLP, 997742, VLDLC, VLDL, TGLX, TRIGL, TRIGP, TGLPOCT, CHHD, CHHDX   BNP No results for input(s): BNPP in the last 72 hours.    Liver Enzymes Recent Labs      09/23/17 2315   TP  6.6   ALB  2.7*   AP  100   SGOT  39*      Thyroid Studies No results found for: T4, T3U, TSH, TSHEXT     Procedures/imaging: see electronic medical records for all procedures/Xrays and details which were not copied into this note but were reviewed prior to creation of Suzanne Clark MD

## 2017-09-24 NOTE — ROUTINE PROCESS
Bedside and Verbal shift change report given to Teretha Boxer, RN (oncoming nurse) by Khang Pineda RN (offgoing nurse). Report included the following information SBAR, Kardex, ED Summary, Procedure Summary, Intake/Output, MAR, Recent Results and Med Rec Status.

## 2017-09-24 NOTE — PERIOP NOTES
TRANSFER - IN REPORT:    Verbal report received from Liliana Ng (name) on Aliyah Chase  being received from OR (unit) for routine progression of care      Report consisted of patients Situation, Background, Assessment and   Recommendations(SBAR). Information from the following report(s) OR Summary, Procedure Summary and Intake/Output was reviewed with the receiving nurse. Opportunity for questions and clarification was provided. Assessment completed upon patients arrival to unit and care assumed.

## 2017-09-24 NOTE — ANESTHESIA POSTPROCEDURE EVALUATION
Post-Anesthesia Evaluation and Assessment    Cardiovascular Function/Vital Signs  Visit Vitals    /68    Pulse 88    Temp 36.8 °C (98.3 °F)    Resp 15    Wt 59.4 kg (131 lb)    SpO2 98%    BMI 23.21 kg/m2       Patient is status post Procedure(s):  HIP ARTHROPLASTY FIDE ,  ANTERIOR APPROACH, RIGHT WITH C-ARM. Nausea/Vomiting: Controlled. Postoperative hydration reviewed and adequate. Pain:  Pain Scale 1: Numeric (0 - 10) (09/24/17 1210)  Pain Intensity 1: 0 (09/24/17 1210)   Managed. Neurological Status:   Neuro (WDL): Within Defined Limits (09/24/17 1210)   At baseline. Mental Status and Level of Consciousness: Arousable. Verbalizes. Pulmonary Status:   O2 Device: Nasal cannula (09/24/17 1210)   Adequate oxygenation and airway patent. Complications related to anesthesia: None    Post-anesthesia assessment completed. No concerns. Patient has met all discharge requirements.     Signed By: Ciro Mann CRNA    September 24, 2017

## 2017-09-24 NOTE — ANESTHESIA PREPROCEDURE EVALUATION
Anesthetic History     PONV   Pertinent negatives: No increased risk of difficult airway, pseudocholinesterase deficiency, malignant hyperthermia and history of awareness of surgery under anesthesia       Review of Systems / Medical History  Patient summary reviewed, nursing notes reviewed and pertinent labs reviewed    Pulmonary          Smoker    Pertinent negatives: No COPD, asthma, recent URI and sleep apnea  Comments: Abstained smoking DOS   Neuro/Psych         Psychiatric history  Pertinent negatives: No seizures, neuromuscular disease, TIA and CVA   Cardiovascular    Hypertension: well controlled          Hyperlipidemia  Pertinent negatives: No past MI, CAD, PAD, dysrhythmias, angina and CHF  Exercise tolerance: >4 METS     GI/Hepatic/Renal  Within defined limits              Endo/Other        Arthritis, cancer and anemia  Pertinent negatives: No diabetes, hypothyroidism and hyperthyroidism   Other Findings   Comments: H/o breast CA           Physical Exam    Airway  Mallampati: III  TM Distance: 4 - 6 cm  Neck ROM: decreased range of motion   Mouth opening: Normal     Cardiovascular  Regular rate and rhythm,  S1 and S2 normal,  no murmur, click, rub, or gallop             Dental    Dentition: Edentulous     Pulmonary  Breath sounds clear to auscultation               Abdominal  GI exam deferred       Other Findings            Anesthetic Plan    ASA: 2  Anesthesia type: general          Induction: Intravenous  Anesthetic plan and risks discussed with: Patient      GA/OETT. Pt anemic preop and ok to receive blood transfusion if needed.

## 2017-09-24 NOTE — CONSULTS
Medicine Consult    Patient:  Key Oates 74 y.o. female  Asked to evaluate patient by Dr Vannesa Alexander  Primary Care Provider:  Luke Griffin MD  Date of Admission:  9/23/2017  Reason for Consult: PreOp        Assessment/Plan     Patient Active Problem List   Diagnosis Code    Malignant melanoma ([facility] [address] C43.9    DDD (degenerative disc disease), lumbar M51.36    Status post lumbar surgery Z98.890    Hip fracture requiring operative repair ([facility] [address] S72.009A    HTN (hypertension) I10    HLD (hyperlipidemia) E78.5    Fall W19. Gary Rose UTI (urinary tract infection) N39.0     1. Mechanical Fall causing right hip fracture   2. UTI  3. Leukocytosis, likely from UTI and reactive   4. HTN  5. HLD  6. Hx of Melanoma   7. Hx of Mastectomy due to breast cancer   DNR    -admitted to ortho service for hip surgery tomorrow. Medical consulted for medical management   -check routine labs- CMP, CBC, UA, Coags, Type and screen, CXR and EKG  -pain control, bedrest. NPO PMN for OR tomorrow. DVT PPx per ortho   -will start ceftriaxone, send urine cultures. Per [facility] records patient has UTI and she was febrile here   -supportive care   -cont other home medications     Thank you for allowing us to participate in this patients care. HPI:   CC:  Key Oates is a 74 y.o. female with past medical history as listed was sent here from [facility] for evaluation of right hip fx. Patient states earlier today while getting around her house and putting her brace on, her legs gave out and she ended up falling on her right side. She wasn't able to get up after that therefore ended up coming to the ER. She lives at private home and gets around with the help of a cane/walker. States her rest of the medical conditions are stable and been taking her meds as prescribed. Only complains of right hip pain at this time.      DNR    Past Medical History:   Diagnosis Date    Anxiety     Arthritis     Cancer ([facility] [address]     breast cancer, bilat    Depression     Hypercholesteremia     Hypertension     Melanoma ([facility] [address]     Nausea & vomiting      Past Surgical History:   Procedure Laterality Date    HX BLADDER SUSPENSION      HX HYSTERECTOMY      HX KNEE ARTHROSCOPY      HX LUMBAR FUSION      HX MASTECTOMY      bilat    HX ORTHOPAEDIC      surgery for hip fx, not a replacement    HX ORTHOPAEDIC      trigger finger, bilat and left heel    HX ORTHOPAEDIC      left knee     HX OTHER SURGICAL      excisiion of melanoma right buttock    HX TUBAL LIGATION        Social History   Substance Use Topics    Smoking status: Current Every Day Smoker     Packs/day: 1.00     Years: 30.00    Smokeless tobacco: Never Used      Comment: instructed not to smoke 24 hrs prior surgery    Alcohol use No     No family history on file. No current facility-administered medications on file prior to encounter. Current Outpatient Prescriptions on File Prior to Encounter   Medication Sig Dispense Refill    HYDROmorphone (DILAUDID) 4 mg tablet Take 1-2 Tabs by mouth every four (4) hours as needed. Max Daily Amount: 48 mg. 90 Tab 0    diazePAM (VALIUM) 5 mg tablet Take 1 Tab by mouth every eight (8) hours as needed. Max Daily Amount: 15 mg. 60 Tab 0    atorvastatin (LIPITOR) 20 mg tablet Take 20 mg by mouth daily.  amLODIPine (NORVASC) 10 mg tablet Take 10 mg by mouth daily. Indications: HYPERTENSION      sertraline (ZOLOFT) 100 mg tablet Take 100 mg by mouth daily.  Indications: ANXIETY WITH DEPRESSION        Allergies   Allergen Reactions    Aleve [Naproxen Sodium] Shortness of Breath and Rash    Iodinated Contrast- Oral And Iv Dye Rash and Cough    Triamterene-Hydrochlorothiazid Itching           Review of Systems  Constitutional: No fever, chills, diaphoresis, malaise, fatigue or weight gain/loss or falls  Skin: no itching or rashes  HEENT: no ear discomfort, hearing loss, tinnitus, epistaxis or sore throat  EYES: no blurry vision, double vision or photophobia  CARDIOVASCULAR: no claudication, cp, palpitations, orthopnea, pnd or LE edema  PULMONARY: no cough, wheeze, shortness of breath or sputum production  GI: no nausea, vomiting, diarrhea, abdominal pain, melena, hematemesis or brbpr  : no dysuria, hematuria  MUSCULOSKELETAL: right hip pain   ENDOCRINE: no heat/cold intolerance, polyuria or polydipsia  HEME: no easy bruising or bleeding  NEURO: no unilateral weakness, numbness, tingling or seizures      Physical Exam:      Visit Vitals    /72    Pulse (!) 101    Temp (!) 101.9 °F (38.8 °C)    Resp 19    SpO2 (!) 80%     There is no height or weight on file to calculate BMI. Physical Exam:  GEN: well nourished, laying in bed in mild distress due to pain   HEENT: atraumatic, nose normal,oropharynx clear, MMM  NECK: supple, trachea midline, no supraclavicular or submandibular adenopathy noted  EYES: conjuctiva normal, lids with out lesions, PERRL  HEART: RRR with out m/r/g, pmi nondisplaced, pulses 2+ distally  LUNGS: equal chest wall expansion, cta bl with out wheezes/rales or rhonchi  AB: soft, +BS, nt/nd no organomegaly  NEURO: alert, awake and oriented x3, gait not assessed, cranial nerves intact, strength 5/5 bl UE and LE, sensation intact, reflexes nonpathological  SKIN: dry, intact, warm no breakdown noted        Laboratory Studies:    No results found for this or any previous visit (from the past 24 hour(s)).     Time Spent: 40 mins

## 2017-09-24 NOTE — PROGRESS NOTES
2155 - Dr Wetzel Seen paged for orders. 5660 - Page returned. [place] Surgeon paged for pain medication orders. 2250 - Hospitalist in to round on patient. 2300 - Patient medicated per MAR. EKG performed. Chest x-ray obtained. Type and screen obtained. Dilaudid PCA initiated, verified with Jhonny Calhoun RN.     0600 - Patient prepped for surgery. Daughter at bedside. Shift summary: Rest of shift uneventful, patient resting quietly in bed.

## 2017-09-24 NOTE — PERIOP NOTES
TRANSFER - OUT REPORT:    Verbal report given to Tiera RN (name) on Baron Glaser  being transferred to OR (unit) for routine progression of care       Report consisted of patients Situation, Background, Assessment and   Recommendations(SBAR). Information from the following report(s) SBAR, Kardex, Procedure Summary and Intake/Output was reviewed with the receiving nurse. Lines:   Peripheral IV 01/21/16 Right Forearm (Active)       Peripheral IV 09/24/17 Right Hand (Active)   Site Assessment Clean, dry, & intact 9/24/2017 12:04 PM   Phlebitis Assessment 0 9/24/2017 12:04 PM   Infiltration Assessment 0 9/24/2017 12:04 PM   Dressing Status Clean, dry, & intact 9/24/2017 12:04 PM   Dressing Type Transparent;Tape 9/24/2017 12:04 PM   Hub Color/Line Status Infusing 9/24/2017 12:04 PM        Opportunity for questions and clarification was provided.       Patient transported with:   O2 @ 2 liters  Registered Nurse

## 2017-09-24 NOTE — H&P
History and Physical        Patient: Yuki Dietz               Sex: female          DOA: 9/23/2017         YOB: 1945      Age:  74 y.o.        LOS:  LOS: 1 day        HPI:     Yuki Dietz is a 74 y.o. femalepresents to Hospital after transfer   Patient of Dr Jaime Nation recently undergoing back sugery fell while applying kevyn brace and presented to [facility] with displaced right femoral neck fracture  Pateint was transferred and I was asked to assume care for anterior approach because of previous surgical resection of melanoma posterior buttock on right    Past Medical History:   Diagnosis Date    Anxiety     Arthritis     Cancer ([facility] [address]     breast cancer, bilat    Depression     Hypercholesteremia     Hypertension     Melanoma ([facility] [address]     Nausea & vomiting        Past Surgical History:   Procedure Laterality Date    HX BLADDER SUSPENSION      HX HYSTERECTOMY      HX KNEE ARTHROSCOPY      HX LUMBAR FUSION      HX MASTECTOMY      bilat    HX ORTHOPAEDIC      surgery for hip fx, not a replacement    HX ORTHOPAEDIC      trigger finger, bilat and left heel    HX ORTHOPAEDIC      left knee     HX OTHER SURGICAL      excisiion of melanoma right buttock    HX TUBAL LIGATION         History reviewed. No pertinent family history. Social History     Social History    Marital status:      Spouse name: N/A    Number of children: N/A    Years of education: N/A     Social History Main Topics    Smoking status: Current Every Day Smoker     Packs/day: 1.00     Years: 30.00    Smokeless tobacco: Never Used      Comment: instructed not to smoke 24 hrs prior surgery    Alcohol use No    Drug use: No    Sexual activity: No     Other Topics Concern    None     Social History Narrative       Prior to Admission medications    Medication Sig Start Date End Date Taking?  Authorizing Provider   HYDROmorphone (DILAUDID) 4 mg tablet Take 1-2 Tabs by mouth every four (4) hours as needed. Max Daily Amount: 48 mg. 8/29/17   DIANA Flores   diazePAM (VALIUM) 5 mg tablet Take 1 Tab by mouth every eight (8) hours as needed. Max Daily Amount: 15 mg. 8/29/17   DIANA Flores   atorvastatin (LIPITOR) 20 mg tablet Take 20 mg by mouth daily. Historical Provider   amLODIPine (NORVASC) 10 mg tablet Take 10 mg by mouth daily. Indications: HYPERTENSION    Historical Provider   sertraline (ZOLOFT) 100 mg tablet Take 100 mg by mouth daily.  Indications: ANXIETY WITH DEPRESSION    Historical Provider       Allergies   Allergen Reactions    Aleve [Naproxen Sodium] Shortness of Breath and Rash    Iodinated Contrast- Oral And Iv Dye Rash and Cough    Triamterene-Hydrochlorothiazid Itching       Review of Systems  Constitutional: negative for fevers, chills and sweats  Respiratory: negative for cough, sputum or hemoptysis  Cardiovascular: negative for chest pain, chest pressure/discomfort, dyspnea  Gastrointestinal: negative for odynophagia and dyspepsia  Genitourinary:negative for hematuria  Musculoskeletal:positive for arthralgias, stiff joints and bone pain  Neurological: negative for seizures and paresthesia      Physical Exam:      Visit Vitals    /67    Pulse 86    Temp 99.1 °F (37.3 °C)    Resp 18    Wt 59.4 kg (131 lb)    SpO2 90%    BMI 23.21 kg/m2       Physical Exam:  General: Alert and Oriented X 3  Lungs: Clear to ausculation bilaterally  Cardiovascular: Regular Rate and Rhythm, without murmur  Abdomen: Soft, nontender with positive bowel sounds in all four quadrants  Gential/Rectal: deferred  Musculoskeletal:right leg with pain on motion no anterior skin abnormalities    Recent Results (from the past 24 hour(s))   EKG, 12 LEAD, INITIAL    Collection Time: 09/23/17 11:06 PM   Result Value Ref Range    Ventricular Rate 97 BPM    Atrial Rate 97 BPM    P-R Interval 130 ms    QRS Duration 86 ms    Q-T Interval 356 ms    QTC Calculation (Bezet) 452 ms    Calculated P Axis 40 degrees    Calculated R Axis 56 degrees    Calculated T Axis 58 degrees    Diagnosis       Normal sinus rhythm  Normal ECG  When compared with ECG of 14-AUG-2017 08:22,  Nonspecific T wave abnormality no longer evident in Lateral leads     METABOLIC PANEL, COMPREHENSIVE    Collection Time: 09/23/17 11:15 PM   Result Value Ref Range    Sodium 138 136 - 145 mmol/L    Potassium 3.2 (L) 3.5 - 5.5 mmol/L    Chloride 102 100 - 108 mmol/L    CO2 27 21 - 32 mmol/L    Anion gap 9 3.0 - 18 mmol/L    Glucose 126 (H) 74 - 99 mg/dL    BUN 10 7.0 - 18 MG/DL    Creatinine 0.95 0.6 - 1.3 MG/DL    BUN/Creatinine ratio 11 (L) 12 - 20      GFR est AA >60 >60 ml/min/1.73m2    GFR est non-AA 58 (L) >60 ml/min/1.73m2    Calcium 8.0 (L) 8.5 - 10.1 MG/DL    Bilirubin, total 0.3 0.2 - 1.0 MG/DL    ALT (SGPT) 16 13 - 56 U/L    AST (SGOT) 39 (H) 15 - 37 U/L    Alk. phosphatase 100 45 - 117 U/L    Protein, total 6.6 6.4 - 8.2 g/dL    Albumin 2.7 (L) 3.4 - 5.0 g/dL    Globulin 3.9 2.0 - 4.0 g/dL    A-G Ratio 0.7 (L) 0.8 - 1.7     CBC WITH AUTOMATED DIFF    Collection Time: 09/23/17 11:15 PM   Result Value Ref Range    WBC 7.7 4.6 - 13.2 K/uL    RBC 2.67 (L) 4.20 - 5.30 M/uL    HGB 8.0 (L) 12.0 - 16.0 g/dL    HCT 24.7 (L) 35.0 - 45.0 %    MCV 92.5 74.0 - 97.0 FL    MCH 30.0 24.0 - 34.0 PG    MCHC 32.4 31.0 - 37.0 g/dL    RDW 14.2 11.6 - 14.5 %    PLATELET 023 958 - 664 K/uL    MPV 9.1 (L) 9.2 - 11.8 FL    NEUTROPHILS 80 (H) 40 - 73 %    LYMPHOCYTES 12 (L) 21 - 52 %    MONOCYTES 6 3 - 10 %    EOSINOPHILS 1 0 - 5 %    BASOPHILS 1 0 - 2 %    ABS. NEUTROPHILS 6.3 1.8 - 8.0 K/UL    ABS. LYMPHOCYTES 0.9 0.9 - 3.6 K/UL    ABS. MONOCYTES 0.5 0.05 - 1.2 K/UL    ABS. EOSINOPHILS 0.1 0.0 - 0.4 K/UL    ABS.  BASOPHILS 0.0 0.0 - 0.06 K/UL    DF AUTOMATED     PROTHROMBIN TIME + INR    Collection Time: 09/23/17 11:15 PM   Result Value Ref Range    Prothrombin time 13.5 11.5 - 15.2 sec    INR 1.1 0.8 - 1.2     TYPE & SCREEN    Collection Time: 09/23/17 11:15 PM   Result Value Ref Range    Crossmatch Expiration 09/26/2017     ABO/Rh(D) O POSITIVE     Antibody screen NEG     CALLED TO: RUBI MOSER RN 3S ON 9/24/2017 0906 BY [facility]     Unit number [id number]     Blood component type RC LR AS1     Unit division 00     Status of unit ALLOCATED     Crossmatch result Compatible     Unit number [id number]     Blood component type RC LR AS1     Unit division 00     Status of unit ALLOCATED     Crossmatch result Compatible    URINALYSIS W/ RFLX MICROSCOPIC    Collection Time: 09/23/17 11:45 PM   Result Value Ref Range    Color YELLOW      Appearance CLEAR      Specific gravity 1.011 1.005 - 1.030      pH (UA) 6.5 5.0 - 8.0      Protein NEGATIVE  NEG mg/dL    Glucose NEGATIVE  NEG mg/dL    Ketone NEGATIVE  NEG mg/dL    Bilirubin NEGATIVE  NEG      Blood TRACE (A) NEG      Urobilinogen 0.2 0.2 - 1.0 EU/dL    Nitrites NEGATIVE  NEG      Leukocyte Esterase LARGE (A) NEG     URINE MICROSCOPIC ONLY    Collection Time: 09/23/17 11:45 PM   Result Value Ref Range    WBC 41 to 50 0 - 5 /hpf    RBC 3 to 5 0 - 5 /hpf    Epithelial cells RARE 0 - 5 /lpf    Bacteria NEGATIVE  NEG /hpf           Assessment/Plan     Principal Problem:    Fall (9/23/2017)    Active Problems:    Malignant melanoma ([facility] [address] (2/22/2016)      DDD (degenerative disc disease), lumbar (8/22/2017)      Status post lumbar surgery (8/22/2017)      Hip fracture requiring operative repair ([address] (9/23/2017)      HTN (hypertension) (9/23/2017)      HLD (hyperlipidemia) (9/23/2017)      UTI (urinary tract infection) (9/23/2017)        Patient evaluated with consult to medicine  And cleared  Risk reviewed   Plan for right hip lito arthroplasty anterior approach

## 2017-09-24 NOTE — BRIEF OP NOTE
BRIEF OPERATIVE NOTE    Date of Procedure: 9/24/2017   Preoperative Diagnosis: fractured right hip  Postoperative Diagnosis: fractured right hip    Procedure(s):  HIP ARTHROPLASTY FIDE ,  ANTERIOR APPROACH, RIGHT WITH C-ARM  Surgeon(s) and Role:     * Rachel Wilson DO - Primary         Assistant Staff:       Surgical Staff:  Circ-1: Diane Lo RN  Radiology Technician: Odalys Bryant RN-1: Brittany Skinner RN  Surg Asst-1: Frankie Gupta  Event Time In   Incision Start 1004   Incision Close 1110     Anesthesia: General   Estimated Blood Loss: 150ml  IVF 1500 ml  Specimens:   ID Type Source Tests Collected by Time Destination   1 : bone right femoral neck Preservative   Rachel Wilson DO 9/24/2017 1012 Pathology      Findings: displaced femoral neck   Complications: none  Implants:   Implant Name Type Inv.  Item Serial No.  Lot No. LRB No. Used Action   STEM FEM 12/14 TAPR 7 -- [employer] - [id number]  STEM FEM 12/14 TAPR 7 -- [employer]  [facility] [employer] [id number] Implanted   HEAD FEM MOD CATHCRT 45MM --  - [id number]  HEAD FEM MOD CATHCRT 45MM --   [facility] [employer] [id number] Implanted   SPACER FEM 12/14 TAPR -3 --  - [id number]   SPACER FEM 12/14 TAPR -3 --    [facility] [employer] [id number] Implanted

## 2017-09-24 NOTE — PROGRESS NOTES
Problem: Mobility Impaired (Adult and Pediatric)  Goal: *Acute Goals and Plan of Care (Insert Text)  In 1-7 days pt will be able to perform:  ST. Bed mobility: Rolling L to R to L modified independent for positioning. 2. Supine to sit to supine S with HR for meals. 3. Sit to stand to sit S with RW in prep for ambulation. LT. Gait: Ambulate >150ft S with RW, WBAT, LSO for home/community mobility. 2. Activity tolerance: Tolerate up in chair 1-2 hours for ADLs. 3. Patient/Family Education: Patient/family to be independent with HEP for follow-up care and safe discharge. PHYSICAL THERAPY EVALUATION     Patient: Kenneth Keane (13 y.o. female)  Date: 2017  Primary Diagnosis: Fractured Hip  Hip fracture requiring operative repair [facility])  Hip fracture requiring operative repair [facility])  fractured right hip  Hip fracture requiring operative repair ([facility] [address]  Procedure(s) (LRB):  HIP ARTHROPLASTY FIDE ,  ANTERIOR APPROACH, RIGHT WITH C-ARM (Right) Day of Surgery   Precautions:   Fall, WBAT, Back, Spinal      ASSESSMENT :  Based on the objective data described below, the patient presents with decreased functional mobility and independence in regard to bed mobility, transfers, gt quality and tolerance, generalized strength, pain, stair negotiation and safety due to recent R MARIE surgery. Pt rating pain in R hip 8/10 on numerical pain scale. Pt recently had back surgery 2017. Pt sustained fall at home, walking around coffee table w/o LSO per daughter when she \"just fell. \"  Pt required additional time for all mobility. Pt did not have LSO here at hospital and requested daughter to bring LSO to hospital so pt able to use during PT. Pt bed was wet and required complete linen change. Pt used log roll technique w/ CGA/min A. Pt able to participate in gt training w/ RW, WBAT, GB and CGA/min A w/ antalgic pattern. Pt returned to supine in bed w/ all needs within reach, SCDs applied and ice pack to R hip. Nurse Tiera aware. Recommend rehab upon hospital d/c. Patient will benefit from skilled intervention to address the above impairments. Patients rehabilitation potential is considered to be Good  Factors which may influence rehabilitation potential include:   [ ]         None noted  [ ]         Mental ability/status  [ ]         Medical condition  [ ]         Home/family situation and support systems  [ ]         Safety awareness  [ ]         Pain tolerance/management  [ ]         Other:        PLAN :  Recommendations and Planned Interventions:  [ ]           Bed Mobility Training             [ ]    Neuromuscular Re-Education  [ ]           Transfer Training                   [ ]    Orthotic/Prosthetic Training  [ ]           Gait Training                          [ ]    Modalities  [ ]           Therapeutic Exercises          [ ]    Edema Management/Control  [ ]           Therapeutic Activities            [ ]    Patient and Family Training/Education  [ ]           Other (comment):     Frequency/Duration: Patient will be followed by physical therapy 1-2 times per day/4-7 days per week to address goals. Discharge Recommendations: Rehab  Further Equipment Recommendations for Discharge: N/A       SUBJECTIVE:   Patient stated I am hurting.       OBJECTIVE DATA SUMMARY:       Past Medical History:   Diagnosis Date    Anxiety      Arthritis      Cancer ([facility] [address]       breast cancer, bilat    Depression      Hypercholesteremia      Hypertension      [facility]      Nausea & vomiting       Past Surgical History:   Procedure Laterality Date    HX BLADDER SUSPENSION        HX HYSTERECTOMY        HX KNEE ARTHROSCOPY        HX LUMBAR FUSION        HX MASTECTOMY         bilat    HX ORTHOPAEDIC         surgery for hip fx, not a replacement    HX ORTHOPAEDIC         trigger finger, bilat and left heel    HX ORTHOPAEDIC         left knee     HX OTHER SURGICAL         excisiion of melanoma right buttock    HX TUBAL LIGATION         Barriers to Learning/Limitations: None  Compensate with: visual, verbal, tactile, kinesthetic cues/model  Prior Level of Function/Home Situation:   Home Situation  Home Environment: Private residence  # Steps to Enter: 4  Wheelchair Ramp: Yes  One/Two Story Residence: One story  Living Alone: Yes  Support Systems: Family member(s)  Patient Expects to be Discharged to[de-identified] Rehabilitation facility  Current DME Used/Available at Home: Brace/Splint, Walker, rolling  Critical Behavior:  Neurologic State: Alert; Appropriate for age  Orientation Level: Oriented X4  Cognition: Appropriate for age attention/concentration; Follows commands  Safety/Judgement: Awareness of environment  Psychosocial  Patient Behaviors: Calm; Cooperative  Family  Behaviors: Supportive  Purposeful Interaction: Yes  Pt Identified Daily Priority: Clinical issues (comment)  Caritas Process: Nurture loving kindness;Establish trust;Teaching/learning; Attend basic human needs;Create healing environment;Supportive expression  Caring Interventions: Reassure  Reassure: Therapeutic listening; Informing;Caring rounds  Skin Condition/Temp: Dry;Warm  Family  Behaviors: Supportive  Skin Integrity: Incision (comment) (back and R hip)  Skin Integumentary  Skin Color: Appropriate for ethnicity  Skin Condition/Temp: Dry;Warm  Skin Integrity: Incision (comment) (back and R hip)  Turgor: Non-tenting  Hair Growth: Present  Varicosities: Absent  Strength:    Strength: Generally decreased, functional  Tone & Sensation:   Tone: Normal  Sensation: Intact  Range Of Motion:  AROM: Generally decreased, functional  Functional Mobility:  Bed Mobility:  Rolling: Contact guard assistance; Additional time (vc)  Supine to Sit: Minimum assistance; Moderate assistance; Additional time (vc)  Sit to Supine: Moderate assistance; Additional time (vc)  Scooting: Contact guard assistance (vc)  Transfers:  Sit to Stand: Minimum assistance;Contact guard assistance; Additional time (vc)  Stand to Sit: Contact guard assistance; Additional time (vc)  Balance:   Sitting: Intact  Standing: Intact; With support  Ambulation/Gait Training:  Distance (ft): 2 Feet (ft)  Assistive Device: Walker, rolling;Gait belt  Ambulation - Level of Assistance: Contact guard assistance;Minimal assistance (vc)  Gait Abnormalities: Antalgic;Decreased step clearance; Step to gait  Right Side Weight Bearing: As tolerated  Base of Support: Shift to left  Stance: Right decreased  Speed/Dorene: Slow  Step Length: Left shortened;Right shortened  Swing Pattern: Left asymmetrical;Right asymmetrical  Interventions: Safety awareness training;Verbal cues  Therapeutic Exercises:   Pain:  Pain Scale 1: Numeric (0 - 10)  Pain Intensity 1: 8  Pain Location 1: Leg;Hip;Knee  Pain Orientation 1: Anterior  Pain Description 1: Aching  Pain Intervention(s) 1: Medication (see MAR)  Activity Tolerance:   Fair   Please refer to the flowsheet for vital signs taken during this treatment. After treatment:   [ ]         Patient left in no apparent distress sitting up in chair  [X]         Patient left in no apparent distress in bed  [X]         Call bell left within reach  [X]         Nursing notified  [ ]         Caregiver present  [ ]         Bed alarm activated      COMMUNICATION/EDUCATION:   [X]         Fall prevention education was provided and the patient/caregiver indicated understanding. [X]         Patient/family have participated as able in goal setting and plan of care. [X]         Patient/family agree to work toward stated goals and plan of care. [ ]         Patient understands intent and goals of therapy, but is neutral about his/her participation. [ ]         Patient is unable to participate in goal setting and plan of care.      Thank you for this referral.  Julio Cesar Ashley, PT   Time Calculation: 34 mins     Eval Complexity: History: HIGH Complexity :3+ comorbidities / personal factors will impact the outcome/ POC Exam:MEDIUM Complexity : 3 Standardized tests and measures addressing body structure, function, activity limitation and / or participation in recreation  Presentation: MEDIUM Complexity : Evolving with changing characteristics  Clinical Decision Making:Medium Complexity amb >30' Overall Complexity:MEDIUM       Mobility  Current  CJ= 20-39%   Goal  CI= 1-19%. The severity rating is based on the Level of Assistance required for Functional Mobility and ADLs.

## 2017-09-25 PROBLEM — E83.42 HYPOMAGNESEMIA: Status: ACTIVE | Noted: 2017-09-25

## 2017-09-25 LAB
ANION GAP SERPL CALC-SCNC: 8 MMOL/L (ref 3–18)
BUN SERPL-MCNC: 9 MG/DL (ref 7–18)
BUN/CREAT SERPL: 9 (ref 12–20)
CALCIUM SERPL-MCNC: 7.1 MG/DL (ref 8.5–10.1)
CHLORIDE SERPL-SCNC: 103 MMOL/L (ref 100–108)
CO2 SERPL-SCNC: 31 MMOL/L (ref 21–32)
CREAT SERPL-MCNC: 0.99 MG/DL (ref 0.6–1.3)
GLUCOSE SERPL-MCNC: 120 MG/DL (ref 74–99)
MAGNESIUM SERPL-MCNC: 1.3 MG/DL (ref 1.6–2.6)
POTASSIUM SERPL-SCNC: 3.6 MMOL/L (ref 3.5–5.5)
SODIUM SERPL-SCNC: 142 MMOL/L (ref 136–145)

## 2017-09-25 PROCEDURE — 83735 ASSAY OF MAGNESIUM: CPT | Performed by: HOSPITALIST

## 2017-09-25 PROCEDURE — 97535 SELF CARE MNGMENT TRAINING: CPT

## 2017-09-25 PROCEDURE — 80048 BASIC METABOLIC PNL TOTAL CA: CPT | Performed by: HOSPITALIST

## 2017-09-25 PROCEDURE — 36415 COLL VENOUS BLD VENIPUNCTURE: CPT | Performed by: HOSPITALIST

## 2017-09-25 PROCEDURE — 74011250636 HC RX REV CODE- 250/636: Performed by: HOSPITALIST

## 2017-09-25 PROCEDURE — 97166 OT EVAL MOD COMPLEX 45 MIN: CPT

## 2017-09-25 PROCEDURE — 65270000029 HC RM PRIVATE

## 2017-09-25 PROCEDURE — 74011250637 HC RX REV CODE- 250/637: Performed by: HOSPITALIST

## 2017-09-25 PROCEDURE — 97530 THERAPEUTIC ACTIVITIES: CPT

## 2017-09-25 PROCEDURE — 74011000258 HC RX REV CODE- 258: Performed by: INTERNAL MEDICINE

## 2017-09-25 PROCEDURE — 74011250636 HC RX REV CODE- 250/636: Performed by: INTERNAL MEDICINE

## 2017-09-25 PROCEDURE — 74011250637 HC RX REV CODE- 250/637: Performed by: ORTHOPAEDIC SURGERY

## 2017-09-25 PROCEDURE — 97110 THERAPEUTIC EXERCISES: CPT

## 2017-09-25 PROCEDURE — 97116 GAIT TRAINING THERAPY: CPT

## 2017-09-25 PROCEDURE — 74011250636 HC RX REV CODE- 250/636: Performed by: ORTHOPAEDIC SURGERY

## 2017-09-25 RX ORDER — LANOLIN ALCOHOL/MO/W.PET/CERES
325 CREAM (GRAM) TOPICAL 2 TIMES DAILY WITH MEALS
Qty: 60 TAB | Refills: 0 | Status: SHIPPED | OUTPATIENT
Start: 2017-09-25 | End: 2018-07-10

## 2017-09-25 RX ORDER — LISINOPRIL 20 MG/1
20 TABLET ORAL DAILY
COMMUNITY
End: 2018-07-10

## 2017-09-25 RX ORDER — ENOXAPARIN SODIUM 100 MG/ML
40 INJECTION SUBCUTANEOUS DAILY
Qty: 21 SYRINGE | Refills: 0 | Status: SHIPPED
Start: 2017-09-25 | End: 2018-07-10

## 2017-09-25 RX ORDER — DOCUSATE SODIUM 100 MG/1
100 CAPSULE, LIQUID FILLED ORAL 2 TIMES DAILY
Qty: 60 CAP | Refills: 2 | Status: SHIPPED | OUTPATIENT
Start: 2017-09-25 | End: 2017-12-24

## 2017-09-25 RX ORDER — DOCUSATE SODIUM 100 MG/1
100 CAPSULE, LIQUID FILLED ORAL DAILY
COMMUNITY
End: 2017-09-27

## 2017-09-25 RX ORDER — HYDROCODONE BITARTRATE AND ACETAMINOPHEN 10; 325 MG/1; MG/1
1 TABLET ORAL
COMMUNITY
End: 2017-09-27

## 2017-09-25 RX ORDER — MAGNESIUM SULFATE HEPTAHYDRATE 40 MG/ML
2 INJECTION, SOLUTION INTRAVENOUS ONCE
Status: COMPLETED | OUTPATIENT
Start: 2017-09-25 | End: 2017-09-25

## 2017-09-25 RX ORDER — ASPIRIN 81 MG/1
81 TABLET ORAL DAILY
COMMUNITY

## 2017-09-25 RX ORDER — OXYCODONE HYDROCHLORIDE 10 MG/1
10 TABLET ORAL
Qty: 54 TAB | Refills: 0 | Status: SHIPPED | OUTPATIENT
Start: 2017-09-25 | End: 2018-07-10

## 2017-09-25 RX ORDER — TEMAZEPAM 30 MG/1
30 CAPSULE ORAL
COMMUNITY
End: 2022-10-06 | Stop reason: CLARIF

## 2017-09-25 RX ORDER — MAGNESIUM SULFATE HEPTAHYDRATE 40 MG/ML
2 INJECTION, SOLUTION INTRAVENOUS
Status: DISPENSED | OUTPATIENT
Start: 2017-09-25 | End: 2017-09-25

## 2017-09-25 RX ADMIN — FERROUS SULFATE TAB 325 MG (65 MG ELEMENTAL FE) 325 MG: 325 (65 FE) TAB at 17:53

## 2017-09-25 RX ADMIN — CEFAZOLIN SODIUM 2 G: 2 SOLUTION INTRAVENOUS at 08:12

## 2017-09-25 RX ADMIN — FERROUS SULFATE TAB 325 MG (65 MG ELEMENTAL FE) 325 MG: 325 (65 FE) TAB at 08:14

## 2017-09-25 RX ADMIN — SODIUM CHLORIDE 100 ML/HR: 900 INJECTION, SOLUTION INTRAVENOUS at 06:20

## 2017-09-25 RX ADMIN — OXYCODONE HYDROCHLORIDE 10 MG: 5 TABLET ORAL at 18:04

## 2017-09-25 RX ADMIN — MAGNESIUM SULFATE HEPTAHYDRATE 2 G: 40 INJECTION, SOLUTION INTRAVENOUS at 10:37

## 2017-09-25 RX ADMIN — SERTRALINE HYDROCHLORIDE 100 MG: 50 TABLET ORAL at 08:14

## 2017-09-25 RX ADMIN — OXYCODONE HYDROCHLORIDE 10 MG: 5 TABLET ORAL at 08:15

## 2017-09-25 RX ADMIN — ATORVASTATIN CALCIUM 20 MG: 20 TABLET, FILM COATED ORAL at 08:14

## 2017-09-25 RX ADMIN — DOCUSATE SODIUM 100 MG: 100 CAPSULE, LIQUID FILLED ORAL at 08:14

## 2017-09-25 RX ADMIN — CEFTRIAXONE 1 G: 1 INJECTION, POWDER, FOR SOLUTION INTRAMUSCULAR; INTRAVENOUS at 17:53

## 2017-09-25 RX ADMIN — MAGNESIUM SULFATE HEPTAHYDRATE 2 G: 40 INJECTION, SOLUTION INTRAVENOUS at 12:10

## 2017-09-25 RX ADMIN — OXYCODONE HYDROCHLORIDE 10 MG: 5 TABLET ORAL at 12:10

## 2017-09-25 RX ADMIN — OXYCODONE HYDROCHLORIDE 10 MG: 5 TABLET ORAL at 04:08

## 2017-09-25 RX ADMIN — ENOXAPARIN SODIUM 40 MG: 40 INJECTION SUBCUTANEOUS at 08:14

## 2017-09-25 NOTE — PROGRESS NOTES
Chart reviewed, met with pt at bedside. Discussed PT/OT recommendations for rehab, pt agreeable. FOC offered and pt chose [facility] in [place]; referral placed with CMS. Awaiting if facility able to accept; pt will need insurance approval to transition to next level of care. Noted discharge order, CM following. Plan:  Pending insurance approval for tomorrow 9/26- [facility] [place]. RN please call report to [phone] and Please include all hard scripts for controlled substances, med rec and dc summary in packet. Please medicate for pain prior to dc if possible and needed to help offset delay when patient first arrives to facility. Pt will need medical transport to facility, is aware she could incur cost for transport. CM remains available. Care Management Interventions  PCP Verified by CM:  Yes  Transition of Care Consult (CM Consult): SNF  Partner SNF: No  Reason Why Partner SNF Not Chosen: Location [facility])  Physical Therapy Consult: Yes  Occupational Therapy Consult: Yes  Current Support Network: Family Lives Nearby  Confirm Follow Up Transport: Family  Plan discussed with Pt/Family/Caregiver: Yes  Freedom of Choice Offered: Yes  Discharge Location  Discharge Placement: Skilled nursing facility

## 2017-09-25 NOTE — OP NOTES
[address] [place] [address]  OPERATIVE REPORT    Name:  Christian Uriarte  MR#:  [id number]  :  1945  Account #:  [de-identified]  Date of Adm:  2017  Date of Surgery:  2017      PREOPERATIVE DIAGNOSIS: Displaced femoral neck fracture, right. POSTOPERATIVE DIAGNOSIS: Displaced femoral neck fracture,  right. PROCEDURES PERFORMED: Right hip hemiarthroplasty, anterior  approach, with C-arm. ESTIMATED BLOOD LOSS: 150. SPECIMENS REMOVED: femoral head for pathology    ANESTHESIA: General.    SURGEON: Montrell Early MD    INTRAVENOUS FLUIDS: 1500 mL. FINDINGS: Displaced femoral neck, soft bone. Bone was taken and  sent for pathology for gross inspection. Patient has history of  melanoma and breast cancer, no known metastasis. COMPLICATIONS: None. IMPLANTS USED: Per list, Hardeman size 7, 14 taper, with 55 head  ball, unipolar, with a -3 taper. OPERATIVE COURSE: A 69-year-old has presented to the hospital  after a fall, was transferred under the care of one of my partners, who  wished for me to continue with plans for procedure. Reviewed the risks  and benefits of the surgery with the patient as well as her daughter,  and consent was obtained. In time of surgical evaluation, she was  taken to the surgical suite. After evaluation by Anesthesia, placed in  supine position, undergoing general anesthesia, placed on a [place]  table. Extremity was then prepped and draped in routine fashion. After  surgical time-out was called, identifying the patient and procedure,  incision about the anterior portion of the hip 1.5 cm inferior and 3 cm  lateral to the ASIS was performed, about 4 cm in length. Dissection  down to the fascia samantha was then performed, incising the fascia and  retracting the muscle laterally, gaining access to the anterior  of the  femur, which was cauterized. A capsulotomy was then performed, and  then the femoral neck fracture was identified.  Additional cut of the neck  was performed with oscillating saw, and then the head was then  removed. Acetabulum was in good shape, without significant  chondrolysis or arthrosis, and it was sized to a size 55 head for the  best fit. At this point, we placed retractors as well as positioning of the  leg to gain access to the femoral portion. Remaining portion of the  bone was cleaned up around the neck and trochanter as well as the  inferior margins. It was then broached sequentially to a size 7 for a  [place] prosthesis. We had the best bony fit and thought to be the best  stability. At this point, we continued with evaluation with fluoroscopy  with trial reduction, with good placement and fit within the bone,  continued with plans for implanting this construct. Once implanted,  there was noted to be good, stable and secure fit. It was reduced with  a -3 head ball, was noted to be appropriate as far as the leg length,  and then was dislocated. The head ball trial was removed, and the final  implant was impacted into place. The wound was copiously irrigated  with bacitracin-normal saline dilute solution, followed by pulsatile  lavage. Additionally, injection around soft tissues of Exparel dilute with  saline and Marcaine. Once this was completed, the fascia samantha fascia  was then reapproximated with a running locked #1 Vicryl, followed by  subcuticular closure with 2-0 Vicryl and final skin closure with Prineo  and Dermabond. The patient tolerated the procedure well. Continue  with postoperative evaluation, weightbearing, DVT prophylaxis, and  appropriate pain control.         MD Rachel Colunga MD  D:  09/24/2017   11:25  T:  09/24/2017   20:57  Job #:  [id number]

## 2017-09-25 NOTE — PROGRESS NOTES
Progress Note      Patient: Logan Sanchez               Sex: female          DOA: 9/23/2017     YOB: 1945      Age:  74 y.o.        LOS:  LOS: 2 days     Status Post: Procedure(s):  HIP ARTHROPLASTY FIDE ,  ANTERIOR APPROACH, RIGHT WITH C-ARM  Surgery Date: 9/24/2017            Subjective:     Logan Sanchez is a 74 y.o. female without c/o right hip pain reasonably well controlled with prn pain medication. Patient denies any complaint of calf pain/ SOB or difficulty breathing. Objective:      Visit Vitals    /71 (BP 1 Location: Right arm, BP Patient Position: At rest)    Pulse 84    Temp 99.9 °F (37.7 °C)    Resp 16    Wt 59.4 kg (131 lb)    SpO2 96%    BMI 23.21 kg/m2       Physical Exam:   Dressing:  clean, dry, intact  Wiggles Toes/Ankle  Foot sensation intact to light touch  No foot edema/ +1 Posterior Tibial Pulse  Leg Lengths appear equal    Xray - good    Intake and Output:  Current Shift:     Last three shifts:  09/23 1901 - 09/25 0700  In: 3448.8 [P.O.:360; I.V.:3088.8]  Out: 0137 [Urine:5745]  Voiding Status:  + void without need for Holden catheter    Lab/Data Reviewed:  Recent Labs      09/25/17   0602  09/24/17   1442   HGB   --   8.6*   HCT   --   26.0*   NA  142   --    K  3.6   --    BUN  9   --    CREA  0.99   --    GLU  120*   --          Medications Reviewed    Assessment/Plan     Principal Problem:    Fracture of neck of right femur ([facility] [address] (9/24/2017)    Active Problems:    Malignant melanoma ([facility] [address] (2/22/2016)      DDD (degenerative disc disease), lumbar (8/22/2017)      Status post lumbar surgery (8/22/2017)      Hip fracture requiring operative repair ([facility] [address] (9/23/2017)      HTN (hypertension) (9/23/2017)      HLD (hyperlipidemia) (9/23/2017)      Fall (9/23/2017)      UTI (urinary tract infection) (9/23/2017)      Hypokalemia (9/24/2017)        · Discontinue Oxygen. · Change IV to Saline Lock. · Discharge Planning for home versus rehab.   · Begin DVT Prophylaxis - Lovenox 40 mg SQ daily           The patient was seen and examined by Dr. Nerissa Lopez today as well and he is in agreement with above.

## 2017-09-25 NOTE — PROGRESS NOTES
Problem: Mobility Impaired (Adult and Pediatric)  Goal: *Acute Goals and Plan of Care (Insert Text)  In 1-7 days pt will be able to perform:  ST. Bed mobility: Rolling L to R to L modified independent for positioning. 2. Supine to sit to supine S with HR for meals. 3. Sit to stand to sit S with RW in prep for ambulation. LT. Gait: Ambulate >150ft S with RW, WBAT, LSO for home/community mobility. 2. Activity tolerance: Tolerate up in chair 1-2 hours for ADLs. 3. Patient/Family Education: Patient/family to be independent with HEP for follow-up care and safe discharge. Outcome: Progressing Towards Goal  PHYSICAL THERAPY TREATMENT     Patient: Anupama Loving (43 y.o. female)  Date: 2017  Diagnosis: Fractured Hip  Hip fracture requiring operative repair [facility])  Hip fracture requiring operative repair [facility])  fractured right hip  Hip fracture requiring operative repair ([facility] [address] Fracture of neck of right femur ([facility] [address]  Procedure(s) (LRB):  HIP ARTHROPLASTY FIDE ,  ANTERIOR APPROACH, RIGHT WITH C-ARM (Right) 1 Day Post-Op  Precautions: Fall, WBAT   Chart, physical therapy assessment, plan of care and goals were reviewed. ASSESSMENT:  Pt making slow progress toward PT goals at this time with c/o 8/10 pain prior to PT session and 9/10 pain post PT session. Upon entry into room pt had removed her NC and her SpO2 was 85%; NC reapplied at 2L and SpO2 roland to 96% within 2 minutes with verbal cuing to breathe in through her nose and out through her mouth. Pt required increased assistance with all bed mobility with moderate verbal and tactile cuing for correct UE placement. During gait training with RW use, pt was only able to ambulate 2 feet with a slow/antalgic/shuffling gait pattern. Pt tolerated sitting up at the EOB for 15 minutes while performing seated therex with max verbal and tactile cuing for completion with correct form.   Left pt in bed as requested with all needs met, call bell within reach, SCDs applied and ice packs in place. Recommend SNF at time of discharge. Progression toward goals:  [ ]      Improving appropriately and progressing toward goals  [X]      Improving slowly and progressing toward goals  [ ]      Not making progress toward goals and plan of care will be adjusted       PLAN:  Patient continues to benefit from skilled intervention to address the above impairments. Continue treatment per established plan of care. Discharge Recommendations:  Skilled Nursing Facility  Further Equipment Recommendations for Discharge:  rolling walker       SUBJECTIVE:   Patient stated I am not feeling good today.       OBJECTIVE DATA SUMMARY:   Critical Behavior:  Neurologic State: Alert, Appropriate for age  Orientation Level: Appropriate for age, Oriented X4  Cognition: Appropriate decision making, Appropriate for age attention/concentration, Follows commands, Appropriate safety awareness  Safety/Judgement: Awareness of environment, Fall prevention, Good awareness of safety precautions, Insight into deficits  Functional Mobility Training:  Bed Mobility:  Rolling: Contact guard assistance; Additional time  Supine to Sit: Minimum assistance; Additional time  Sit to Supine: Moderate assistance  Scooting: Contact guard assistance   Transfers:  Sit to Stand: Minimum assistance; Additional time  Stand to Sit: Contact guard assistance; Additional time  Balance:  Sitting: Intact  Standing: Intact; With support  Ambulation/Gait Training:  Distance (ft): 2 Feet (ft)  Assistive Device: Walker, rolling;Gait belt  Ambulation - Level of Assistance: Minimal assistance   Gait Abnormalities: Antalgic;Decreased step clearance;Shuffling gait  Right Side Weight Bearing: As tolerated   Base of Support: Shift to left  Stance: Right decreased; Left increased  Speed/Dorene: Slow;Shuffled  Step Length: Right shortened;Left shortened  Swing Pattern: Right asymmetrical;Left asymmetrical   Interventions: Visual/Demos; Verbal cues;Tactile cues; Safety awareness training   Therapeutic Exercises:   HEP included ankle pumps, heel/toe raises seated, LAQ, AAROM heel slides x 10 reps  Pain:  Pain Scale 1: Numeric (0 - 10)  Pain Intensity 1: 8  Pain Location 1: Hip  Pain Orientation 1: Right  Pain Description 1: Aching; Intermittent  Pain Intervention(s) 1: Medication (see MAR); Position  Activity Tolerance:   Fair  Please refer to the flowsheet for vital signs taken during this treatment.   After treatment:   [ ] Patient left in no apparent distress sitting up in chair  [X] Patient left in no apparent distress in bed  [X] Call bell left within reach  [X] Nursing notified  [ ] Caregiver present  [ ] Bed alarm activated     Taniya Ferris PT, DPT      Time Calculation: 38 mins

## 2017-09-25 NOTE — DISCHARGE INSTRUCTIONS
[facility]  Dr. Paulie Soriano Post-Operative Instructions Total Hip Replacement    ACTIVITIES :  1. You may be up and walking about the house with your walker. 2.  Activities around the house, such as washing dishes, fixing light meals, and your own personal care are fine. 3.  Avoid strenuous activities, such as vacuuming, lifting laundry or grocery bags. 4.  Walking is the best way to rebuild strength and stamina. Start SLOWLY and gradually increase your distance. 5.  Avoid any jogging, running or excessive stair-climbing   6. Your home physical therapist will work with you for the first 7-14 days. 7.  Follow-up with Dr. Bernardino Vale in 10-14 days. BATHING and INCISION CARE:  1. The incision may be tender to touch or feel numb: this is normal.   2.  Keep the incision clean and dry no showering until your follow-up appointment. The incision will be closed with sutures under the skin and the skin will be glued. 3.  Do not apply any lotions, ointments or oils on the incision. 4.  If you notice any excessive swelling, redness, or persistent drainage around the incision, notify the office immediately. DRIVIN. You should not drive until after your follow-up appointment. 2.  You can be in a vehicle for short distances, but if you travel any long distance, please stop about every 30 minutes and walk/stretch. 3.  You should NEVER drive while taking narcotic medication. RETURN TO WORK :  1. The decision to return to work will be determined on an individual basis. 2.  Many people who have a strenuous job (construction, heavy labor, etc) may need to be off work for up to 12 weeks. 3.  If you need a work note, please let us know as soon as possible, and not the same day you are planning to return to work. NUTRITION :  1.  Good nutrition is an essential part of healing. 2.  You should eat a balanced diet each day, including fruits, vegetables, dairy products and protein. 3.  Remember to drink plenty of water. 4.  If you have not had a bowel movement within 3 days of surgery, you will need to use a laxative or suppository that can be obtained over-the-counter at your local pharmacy. MEDICATIONS -  1. You may resume the medications you were taking before surgery. 2.  You will receive a prescription for pain medication at discharge from the hospital. The pain medication works best if taken before the pain becomes severe. 3.  To reduce stomach upset, always take the medication with food. 4.  Begin to wean yourself off the pain medication during the second week after discharge. 5.  If you need a refill, please call the office during working hours at least 2 days before your prescription runs out. Do not wait until your bottle is empty to call for a refill. 6.  You will also be prescribed a blood thinner that you will take by injection for 21 days post-operatively. 7.  DO NOT drive if you are taking narcotic pain medications. HOME HEALTH CARE:  1.   A home health care service has been set-up for you to help assist you once you leave the hospital.  2.  They will contact you either before you leave the hospital or within 24 hours once you have been discharged home. 3. A nurse will assist you with your dressing changes and a Physical Therapist with help you with your therapy needs. CALL THE OFFICE:   If you have severe pain unrelieved by the medications;   If you have a fever of 101.0°F or greater;    If you notice excessive swelling, redness, or persistent drainage from the incision or IV site; [facility] office number is [phone] from 8:00am to 5:00pm Monday through Friday. After 5:00pm, on weekends, or holidays, please leave a message with our answering service and the doctor on-call will get back to you shortly.

## 2017-09-25 NOTE — PROGRESS NOTES
Problem: Self Care Deficits Care Plan (Adult)  Goal: *Acute Goals and Plan of Care (Insert Text)  Occupational Therapy Goals  Initiated 9/25/2017 within 7 day(s). 1. Patient will perform lower body dressing with minimal assistance/contact guard assist   2. Patient will perform toilet transfers with supervision/set-up. 3. Patient will perform all aspects of toileting with supervision/set-up. 4. Patient will participate in standing with supervision/set-up for 5 minutes during ADL to increase activity tolerance for functional activity. 5. Patient will utilize energy conservation techniques during functional activities with verbal cues. OCCUPATIONAL THERAPY EVALUATION     Patient: Vince Ferguson (16 y.o. female)  Date: 9/25/2017  Primary Diagnosis: Fractured Hip  Hip fracture requiring operative repair [facility])  Hip fracture requiring operative repair [facility])  fractured right hip  Hip fracture requiring operative repair ([facility] [address]  Procedure(s) (LRB):  HIP ARTHROPLASTY FIDE ,  ANTERIOR APPROACH, RIGHT WITH C-ARM (Right) 1 Day Post-Op   Precautions:  Back, Fall, WBAT      ASSESSMENT :  Based on the objective data described below, the patient presents with right hemiarthroplasty s/p right femoral neck fracture. Pt also had back surgery on 8/28/17. Pt completed toileting with min/mod assist for toileting hygiene and balance. Min assist for transfer from bed to bsc this session. Decreased activity tolerance and unable to tolerate more than transfer to/from bsc this session. Verbal cues for safety and noted loss of balance posteriorly. Pt max assist for LB dressing. Pt could benefit from OT to increase I with ADLs, transfers, mobility, activity tolerance and strength for functional activity. Patient will benefit from skilled intervention to address the above impairments.   Patients rehabilitation potential is considered to be Good  Factors which may influence rehabilitation potential include:   [ ]             None noted  [ ]             Mental ability/status  [ ]             Medical condition  [ ]             Home/family situation and support systems  [ ]             Safety awareness  [ ]             Pain tolerance/management  [ ]             Other:        PLAN :  Recommendations and Planned Interventions:  [X]               Self Care Training                  [X]        Therapeutic Activities  [X]               Functional Mobility Training    [ ]        Cognitive Retraining  [X]               Therapeutic Exercises           [X]        Endurance Activities  [X]               Balance Training                   [ ]        Neuromuscular Re-Education  [ ]               Visual/Perceptual Training     [X]   Home Safety Training  [X]               Patient Education                 [X]        Family Training/Education  [ ]               Other (comment):     Frequency/Duration: Patient will be followed by occupational therapy 1-2 times per day/4-7 days per week to address goals. Discharge Recommendations: Rehab  Further Equipment Recommendations for Discharge: N/A       SUBJECTIVE:   Patient stated I need to pee.       OBJECTIVE DATA SUMMARY:       Past Medical History:   Diagnosis Date    Anxiety      Arthritis      Cancer ([facility] [address]       breast cancer, bilat    Depression      Hypercholesteremia      Hypertension      Melanoma ([facility] [address]      Nausea & vomiting       Past Surgical History:   Procedure Laterality Date    HX BLADDER SUSPENSION        HX HYSTERECTOMY        HX KNEE ARTHROSCOPY        HX LUMBAR FUSION        HX MASTECTOMY         bilat    HX ORTHOPAEDIC         surgery for hip fx, not a replacement    HX ORTHOPAEDIC         trigger finger, bilat and left heel    HX ORTHOPAEDIC         left knee     HX OTHER SURGICAL         excisiion of melanoma right buttock    HX TUBAL LIGATION         Barriers to Learning/Limitations: yes;  physical  Compensate with: visual, verbal, tactile, kinesthetic cues/model  Prior Level of Function/Home Situation: I with ADLs prior   Home Situation  Home Environment: Private residence  # Steps to Enter: 4  Wheelchair Ramp: Yes  One/Two Story Residence: One story  Living Alone: Yes  Support Systems: Family member(s)  Patient Expects to be Discharged to[de-identified] Rehabilitation facility  Current DME Used/Available at Home: Brace/Splint, Walker, rolling  [ ]  Right hand dominant           [ ]  Left hand dominant  Cognitive/Behavioral Status:  Neurologic State: Alert; Appropriate for age  Orientation Level: Oriented X4  Cognition: Appropriate decision making; Appropriate for age attention/concentration; Appropriate safety awareness; Follows commands  Safety/Judgement: Awareness of environment; Fall prevention  Skin: incision on right LE covered with dressing  Edema: min edema noted on right LE  Vision/Perceptual:    Corrective Lenses: Glasses  Coordination:  Coordination: Within functional limits  Fine Motor Skills-Upper: Left Intact; Right Intact    Gross Motor Skills-Upper: Left Intact; Right Intact  Balance:  Sitting: Intact  Standing: Intact; With support  Strength:  Strength: Generally decreased, functional  Tone & Sensation: N/A  Range of Motion:  AROM: Within functional limits  Functional Mobility and Transfers for ADLs:  Bed Mobility:  Rolling: Contact guard assistance  Supine to Sit: Minimum assistance; Additional time  Sit to Supine: Moderate assistance; Additional time  Scooting: Contact guard assistance; Additional time  Transfers:  Sit to Stand: Minimum assistance  ADL Assessment:  Lower Body Dressing: Maximum assistance  Toileting: Minimum assistance  ADL Intervention:  Cognitive Retraining  Safety/Judgement: Awareness of environment; Fall prevention     Pain:  Pain Scale 1: Numeric (0 - 10)  Pain Intensity 1: 8  Pain Location 1: Hip  Pain Orientation 1: Right  Pain Description 1: Aching; Intermittent  Pain Intervention(s) 1: Medication (see MAR); Position  Activity Tolerance:   Fair -  Please refer to the flowsheet for vital signs taken during this treatment. After treatment:   [ ] Patient left in no apparent distress sitting up in chair  [X] Patient left in no apparent distress in bed  [X] Call bell left within reach  [ ] Nursing notified  [ ] Caregiver present  [ ] Bed alarm activated      COMMUNICATION/EDUCATION:   [X] Home safety education was provided and the patient/caregiver indicated understanding. [X] Patient/family have participated as able in goal setting and plan of care. [X] Patient/family agree to work toward stated goals and plan of care. [ ] Patient understands intent and goals of therapy, but is neutral about his/her participation. [ ] Patient is unable to participate in goal setting and plan of care. Thank you for this referral.  Ngoc Srinivasan, OTR/L  Time Calculation: 24 mins      Carry  Current  CL= 60-79%    Goal  CI= 1-19%. The severity rating is based on the Level of Assistance required for Functional Mobility and ADLs.

## 2017-09-25 NOTE — PROGRESS NOTES
2000: notified of tempeture. BP was also low for patient at 100/53. Given tylenol, will recheck. No complaints of facial flushing swelling. Right hip site clean and dry without drainage. No redness or warmth from site. Urine is clear and draining in owens bag. Will continue to monitor. 2059: BP and temp recheck, MD notified and orders taken for blood cultures, Bolus and another dose of tylenol. Called pharmacy to verify clair douglass 'd second dose of tylenol. Will recheck in 1 hour, patient to call if worsening feeling. Covers were removed, air was turned up and patient was turned. 2200: Recheck temp 99.5 oral BP 88/41. Dr Chai Olsen was called and updated. Fluids increased to 100/hr and monitor BP over night. Hold Morning BP meds, and advised not to give pain medication until BP increases. Call Dr. Princess Sears if patient needs medication coverage. 2345: T: 98.3 oral, /52. 0140: /58 No complaints at this time. 0400: Blood pressure up to 136/62. Pain medication given.  Told patient will recheck in 1 hr.   0600: BP stable     Patient Vitals for the past 12 hrs:   Temp Pulse Resp BP SpO2   09/25/17 0615 99.9 °F (37.7 °C) 84 16 125/71 96 %   09/25/17 0400 98.1 °F (36.7 °C) 82 16 136/62 97 %   09/25/17 0140 - - - 127/58 -   09/24/17 2345 98.3 °F (36.8 °C) 80 16 107/52 95 %   09/24/17 2230 98.8 °F (37.1 °C) 85 16 (!) 85/49 97 %   09/24/17 2200 99.5 °F (37.5 °C) 84 16 (!) 88/41 98 %   09/24/17 2059 (!) 102.1 °F (38.9 °C) 96 16 (!) 83/48 92 %   09/24/17 1959 (!) 101.4 °F (38.6 °C) 94 18 100/53 95 %

## 2017-09-25 NOTE — PROGRESS NOTES
Problem: Mobility Impaired (Adult and Pediatric)  Goal: *Acute Goals and Plan of Care (Insert Text)  In 1-7 days pt will be able to perform:  ST. Bed mobility: Rolling L to R to L modified independent for positioning. 2. Supine to sit to supine S with HR for meals. 3. Sit to stand to sit S with RW in prep for ambulation. LT. Gait: Ambulate >150ft S with RW, WBAT, LSO for home/community mobility. 2. Activity tolerance: Tolerate up in chair 1-2 hours for ADLs. 3. Patient/Family Education: Patient/family to be independent with HEP for follow-up care and safe discharge. Outcome: Progressing Towards Goal  PHYSICAL THERAPY TREATMENT     Patient: Vanna Hess (83 y.o. female)  Date: 2017  Diagnosis: Fractured Hip  Hip fracture requiring operative repair [facility])  Hip fracture requiring operative repair [facility])  fractured right hip  Hip fracture requiring operative repair ([facility] [address] Fracture of neck of right femur ([facility] [address]  Procedure(s) (LRB):  HIP ARTHROPLASTY FIDE ,  ANTERIOR APPROACH, RIGHT WITH C-ARM (Right) 1 Day Post-Op  Precautions: Back, Fall, WBAT   Chart, physical therapy assessment, plan of care and goals were reviewed. ASSESSMENT:  Pt making slow progress toward PT goals at this time. Pt continues to require increased assistance with bed mobility and functional transfers. During gait training pt ambulated a total of 6 feet (3 feet x 2) to the [facility] and back with RW use, Joshua demonstrating a slow/shuffling gait pattern. Left pt in bed with all needs met, call bell within reach, ice packs in place and SCDs applied. Informed nurse of pt progress with PT. Recommend rehab at time of discharge.    Progression toward goals:  [ ]      Improving appropriately and progressing toward goals  [X]      Improving slowly and progressing toward goals  [ ]      Not making progress toward goals and plan of care will be adjusted       PLAN:  Patient continues to benefit from skilled intervention to address the above impairments. Continue treatment per established plan of care. Discharge Recommendations:  Rehab  Further Equipment Recommendations for Discharge:  rolling walker       SUBJECTIVE:   Patient stated I don't have the catheter anymore.       OBJECTIVE DATA SUMMARY:   Critical Behavior:  Neurologic State: Alert, Appropriate for age  Orientation Level: Oriented X4  Cognition: Appropriate decision making, Appropriate for age attention/concentration, Appropriate safety awareness, Follows commands  Safety/Judgement: Awareness of environment, Fall prevention  Functional Mobility Training:  Bed Mobility:  Rolling: Contact guard assistance  Supine to Sit: Minimum assistance; Additional time  Sit to Supine: Moderate assistance; Additional time  Scooting: Contact guard assistance; Additional time   Transfers:  Sit to Stand: Minimum assistance  Stand to Sit: Minimum assistance   Balance:  Sitting: Intact  Standing: Intact; With support  Ambulation/Gait Training:  Distance (ft): 6 Feet (ft)  Assistive Device: Walker, rolling;Gait belt  Ambulation - Level of Assistance: Minimal assistance   Gait Abnormalities: Antalgic  Right Side Weight Bearing: As tolerated   Base of Support: Shift to left  Stance: Right decreased; Left increased  Speed/Dorene: Slow;Shuffled  Step Length: Right shortened;Left shortened  Swing Pattern: Right asymmetrical;Left asymmetrical   Interventions: Visual/Demos; Verbal cues; Tactile cues; Safety awareness training   Pain:  Pain Scale 1: Numeric (0 - 10)  Pain Intensity 1: 8  Pain Location 1: Hip  Pain Orientation 1: Right  Pain Description 1: Aching; Intermittent  Pain Intervention(s) 1: Medication (see MAR); Position  Activity Tolerance:   Fair  Please refer to the flowsheet for vital signs taken during this treatment.   After treatment:   [ ] Patient left in no apparent distress sitting up in chair  [X] Patient left in no apparent distress in bed  [X] Call bell left within reach  [X] Nursing notified  [ ] Caregiver present  [X] Bed alarm activated        Casey Ferris PT, DPT       Time Calculation: 24 mins

## 2017-09-25 NOTE — ROUTINE PROCESS
Bedside and Verbal shift change report given to CLINT Chirinos RN (oncoming nurse) by Addie Rx (offgoing nurse). Report included the following information SBAR, Kardex, Intake/Output and MAR.

## 2017-09-25 NOTE — PROGRESS NOTES
0813-Alert and oriented x 3. Lungs CTA, but diminshed. BS active x 4 quads. NPO since midnight in preparation for surgery. PCA pump in place. No sticks to left side d/t breast cancer. 0815-Yi called from OR to say patient would be picked up soon. 0830-Left floor via bed for surgery to right hip. 1240-returned to floor from surgery. Mepilex dressing to right hip clean dry and intact. RIght hand continues with IV access x 2 sites to right hand, flush without difficulty. PCA pump discontinued. Potassium given in PACU x one dose. Two more Potassium to be given on floor. 1513-Pain medication given for c/o pain to right hip rated 8/10.    1600-Pain decreased to 6/10.    1833-Pain medication given for c/o pain to right hip rated 8/10.    1915-Pain decreased to 6/10. Shift summary-Patient worked with PT. Tolerated small amount of meals. Holden catheter in place draining cloudy, light yellow urine.

## 2017-09-25 NOTE — ROUTINE PROCESS
Bedside shift change report given to Jacqueline Esteban RN (oncoming nurse) by Pam Roberson RN (offgoing nurse). Report included the following information SBAR, Kardex, OR Summary, Procedure Summary, Intake/Output, MAR, Recent Results and Med Rec Status. 1402 Pt refusing owens catheter removal. States \"You better not take it out, I can't get up to pee. \" Pt has been educated on reducing post operative infection and risk of CAUTI, continues to refused removal. Will discuss with physician pt refusal.    1100 Pt agrees to owens catheter removal. Patricia Erazoing catheter applied to pt due to her limited mobility and concern about incontinence. Spoke with granddaughter, Freya Anders, who states that pt has had issues with opioid misuse in the past. She attributes her fall to overuse of Dilaudid and Percocet. States that Aunt helps care for pt and also enables opioid misuse. According to graddaughter, pt has requested that family members bring medication from home as her pain is not controlled here. Freya Chago is a nurse and is aware that this is not appropriate, she agrees that current pain management is acceptable and additional medication will encourage overdose. Freya Chago states that she has sent out group text to everyone in family stating that they should not bring medications to hospital for pt. Will monitor pt for withdrawal and alert staff to monitor for potential of bringing in outside medication. 26 Pt assisted to c with therapy and able to void without difficulty. Nurse attempt to replace pure wick catheter, she refuses. States she does not like the feeling of the pure wick and would rather get on the bedpan. Pt educated to use call light when she needs to use the restroom. Expresses understanding.

## 2017-09-25 NOTE — PROGRESS NOTES
conducted an initial consultation and Spiritual Assessment for [facility], who is a 74 y.o.,female. Patients Primary Language is: [place]. According to the patients EMR Worship Affiliation is: [facility].     Patient herself asked to be prayed for which  did. The reason the Patient came to the hospital is:   Patient Active Problem List    Diagnosis Date Noted    Hypomagnesemia 09/25/2017    Hypokalemia 09/24/2017    Fracture of neck of right femur ([facility] [address] 09/24/2017    Hip fracture requiring operative repair ([facility] [address] 09/23/2017    HTN (hypertension) 09/23/2017    HLD (hyperlipidemia) 09/23/2017    Fall 09/23/2017    UTI (urinary tract infection) 09/23/2017    DDD (degenerative disc disease), lumbar 08/22/2017    Status post lumbar surgery 08/22/2017    Malignant melanoma ([facility] [address] 02/22/2016        The  provided the following Interventions:  Initiated a relationship of care and support. Explored issues of yuni, belief, spirituality and Presybeterian/ritual needs while hospitalized. Listened empathically. Provided chaplaincy education. Provided information about Spiritual Care Services. Offered prayer and assurance of continued prayers on patients behalf. Chart reviewed. The following outcomes were achieved:  Patient shared limited information about both their medical narrative and spiritual journey/beliefs. Patient processed feeling about current hospitalization. Patient expressed gratitude for pastoral care visit. Assessment:  Patient does not have any Presybeterian/cultural needs that will affect patients preferences in health care. There are no further spiritual or Presybeterian issues which require intervention at this time. Plan:  Chaplains will continue to follow and will provide pastoral care on an as needed/requested basis.  recommends bedside caregivers page  on duty if patient shows signs of acute spiritual or emotional distress.       Sister Carlene Abel, [place], [address]  [phone]

## 2017-09-25 NOTE — ROUTINE PROCESS
Bedside and Verbal shift change report given to QUINN Stahl RN (oncoming nurse) by YURI RN (offgoing nurse). Report included the following information SBAR, Kardex, Intake/Output and MAR.

## 2017-09-25 NOTE — PROGRESS NOTES
Hospitalist Progress Note-critical care note     Patient: Kristian Ordonez MRN: [id number]  CSN: [id number]    YOB: 1945  Age: 74 y.o. Sex: female    DOA: 9/23/2017 LOS:  LOS: 2 days            Chief complaint: hip fracture, htn,       Assessment/Plan         Hospital Problems  Date Reviewed: 9/25/2017          Codes Class Noted POA    Hypomagnesemia ICD-10-CM: E83.42  ICD-9-CM: 275.2  9/25/2017 Unknown        Hypokalemia ICD-10-CM: E87.6  ICD-9-CM: 276.8  9/24/2017 Unknown        * (Principal)Fracture of neck of right femur ([facility] [address] ICD-10-CM: S72.001A  ICD-9-CM: 820.8  9/24/2017 Unknown        Hip fracture requiring operative repair [facility]) ICD-10-CM: S72.009A  ICD-9-CM: 820.8  9/23/2017 Unknown        HTN (hypertension) ICD-10-CM: I10  ICD-9-CM: 401.9  9/23/2017 Unknown        HLD (hyperlipidemia) ICD-10-CM: E78.5  ICD-9-CM: 272.4  9/23/2017 Unknown        Fall ICD-10-CM: W19. Maday Beverly  ICD-9-CM: E888.9  9/23/2017 Unknown        UTI (urinary tract infection) ICD-10-CM: N39.0  ICD-9-CM: 599.0  9/23/2017 Unknown        DDD (degenerative disc disease), lumbar ICD-10-CM: M51.36  ICD-9-CM: 722.52  8/22/2017 Yes        Status post lumbar surgery ICD-10-CM: Z98.890  ICD-9-CM: V45.89  8/22/2017 Yes        Malignant melanoma ([facility] [address] ICD-10-CM: C43.9  ICD-9-CM: 172.9  2/22/2016 Yes            1. Mechanical Fall causing right hip fracture   Post surgery day one , reported pain, hx of pain medication overdose . Cautious for pain meds   Managed per primary team     2. UTI   fever post surgery , bcx no growth so far. Will continue rocephin  3. Leukocytosis, likely from UTI and reactive   Resolved   4. HTN  Continue home medication   5. HLD  On statin   6. Hx of Melanoma   7. Hx of Mastectomy due to breast cancer   8 hypokalemia   Resolved   DNR    Subjective: It is painful ,   Nurse; no acute issue , hx of pain meds over dose   Continue pt/ot.  D/c planning     Review of systems:    General: No fevers or chills. Cardiovascular: No chest pain or pressure. No palpitations. Pulmonary: No shortness of breath. Gastrointestinal: No nausea, vomiting. MSK: hip pain   Vital signs/Intake and Output:  Visit Vitals    /63 (BP 1 Location: Right arm, BP Patient Position: At rest)    Pulse 91    Temp 98.2 °F (36.8 °C)    Resp 16    Wt 59.4 kg (131 lb)    SpO2 97%    BMI 23.21 kg/m2     Current Shift:  09/25 0701 - 09/25 1900  In: -   Out: 800 [Urine:800]  Last three shifts:  09/23 1901 - 09/25 0700  In: 3448.8 [P.O.:360; I.V.:3088.8]  Out: 9894 [Urine:5745]    Physical Exam:  General: WD, WN. Alert, cooperative, no acute distress    HEENT: NC, Atraumatic. PERRLA, anicteric sclerae. Lungs: CTA Bilaterally. No Wheezing/Rhonchi/Rales. Heart:  Regular  rhythm,  + murmur, No Rubs, No Gallops  Abdomen: Soft, Non distended, Non tender.  +Bowel sounds,   Extremities: No c/c. Rt hip covered with gauze, ice pack noted   Psych:   Not anxious or agitated. Neurologic:  No acute neurological deficit. Labs: Results:       Chemistry Recent Labs      09/25/17   0602  09/23/17 2315   GLU  120*  126*   NA  142  138   K  3.6  3.2*   CL  103  102   CO2  31  27   BUN  9  10   CREA  0.99  0.95   CA  7.1*  8.0*   AGAP  8  9   BUCR  9*  11*   AP   --   100   TP   --   6.6   ALB   --   2.7*   GLOB   --   3.9   AGRAT   --   0.7*      CBC w/Diff Recent Labs      09/24/17   1442  09/23/17 2315   WBC   --   7.7   RBC   --   2.67*   HGB  8.6*  8.0*   HCT  26.0*  24.7*   PLT   --   280   GRANS   --   80*   LYMPH   --   12*   EOS   --   1      Cardiac Enzymes No results for input(s): CPK, CKND1, JESSICA in the last 72 hours.     No lab exists for component: CKRMB, TROIP   Coagulation Recent Labs      09/23/17   2315   PTP  13.5   INR  1.1       Lipid Panel No results found for: CHOL, CHOLPOCT, CHOLX, CHLST, CHOLV, 532096, HDL, LDL, LDLC, DLDLP, 824504, VLDLC, VLDL, TGLX, TRIGL, TRIGP, TGLPOCT, CHHD, CHHDX   BNP No results for input(s): BNPP in the last 72 hours.    Liver Enzymes Recent Labs      09/23/17   2315   TP  6.6   ALB  2.7*   AP  100   SGOT  39*      Thyroid Studies No results found for: T4, T3U, TSH, TSHEXT, TSHEXT     Procedures/imaging: see electronic medical records for all procedures/Xrays and details which were not copied into this note but were reviewed prior to creation of Shana Courtney MD

## 2017-09-26 LAB
ANION GAP SERPL CALC-SCNC: 7 MMOL/L (ref 3–18)
BACTERIA SPEC CULT: NORMAL
BUN SERPL-MCNC: 10 MG/DL (ref 7–18)
BUN/CREAT SERPL: 11 (ref 12–20)
CALCIUM SERPL-MCNC: 7.2 MG/DL (ref 8.5–10.1)
CHLORIDE SERPL-SCNC: 99 MMOL/L (ref 100–108)
CO2 SERPL-SCNC: 30 MMOL/L (ref 21–32)
CREAT SERPL-MCNC: 0.89 MG/DL (ref 0.6–1.3)
GLUCOSE SERPL-MCNC: 107 MG/DL (ref 74–99)
MAGNESIUM SERPL-MCNC: 2 MG/DL (ref 1.6–2.6)
POTASSIUM SERPL-SCNC: 2.9 MMOL/L (ref 3.5–5.5)
SERVICE CMNT-IMP: NORMAL
SODIUM SERPL-SCNC: 136 MMOL/L (ref 136–145)

## 2017-09-26 PROCEDURE — 74011250636 HC RX REV CODE- 250/636: Performed by: ORTHOPAEDIC SURGERY

## 2017-09-26 PROCEDURE — 74011250636 HC RX REV CODE- 250/636: Performed by: INTERNAL MEDICINE

## 2017-09-26 PROCEDURE — 97110 THERAPEUTIC EXERCISES: CPT

## 2017-09-26 PROCEDURE — 83735 ASSAY OF MAGNESIUM: CPT | Performed by: HOSPITALIST

## 2017-09-26 PROCEDURE — 80048 BASIC METABOLIC PNL TOTAL CA: CPT | Performed by: HOSPITALIST

## 2017-09-26 PROCEDURE — 77010033678 HC OXYGEN DAILY

## 2017-09-26 PROCEDURE — 36415 COLL VENOUS BLD VENIPUNCTURE: CPT | Performed by: HOSPITALIST

## 2017-09-26 PROCEDURE — 74011250637 HC RX REV CODE- 250/637: Performed by: ORTHOPAEDIC SURGERY

## 2017-09-26 PROCEDURE — 97116 GAIT TRAINING THERAPY: CPT

## 2017-09-26 PROCEDURE — 97530 THERAPEUTIC ACTIVITIES: CPT

## 2017-09-26 PROCEDURE — 65270000029 HC RM PRIVATE

## 2017-09-26 PROCEDURE — 74011000258 HC RX REV CODE- 258: Performed by: INTERNAL MEDICINE

## 2017-09-26 PROCEDURE — 74011250637 HC RX REV CODE- 250/637: Performed by: HOSPITALIST

## 2017-09-26 RX ORDER — NALOXONE HYDROCHLORIDE 4 MG/.1ML
SPRAY NASAL
Qty: 2 EACH | Refills: 0 | Status: SHIPPED | OUTPATIENT
Start: 2017-09-26 | End: 2022-10-06 | Stop reason: CLARIF

## 2017-09-26 RX ORDER — POTASSIUM CHLORIDE 20 MEQ/1
40 TABLET, EXTENDED RELEASE ORAL
Status: COMPLETED | OUTPATIENT
Start: 2017-09-26 | End: 2017-09-26

## 2017-09-26 RX ORDER — POTASSIUM CHLORIDE 7.45 MG/ML
10 INJECTION INTRAVENOUS
Status: COMPLETED | OUTPATIENT
Start: 2017-09-26 | End: 2017-09-26

## 2017-09-26 RX ADMIN — FERROUS SULFATE TAB 325 MG (65 MG ELEMENTAL FE) 325 MG: 325 (65 FE) TAB at 16:24

## 2017-09-26 RX ADMIN — ATORVASTATIN CALCIUM 20 MG: 20 TABLET, FILM COATED ORAL at 09:22

## 2017-09-26 RX ADMIN — FERROUS SULFATE TAB 325 MG (65 MG ELEMENTAL FE) 325 MG: 325 (65 FE) TAB at 09:23

## 2017-09-26 RX ADMIN — DOCUSATE SODIUM 100 MG: 100 CAPSULE, LIQUID FILLED ORAL at 20:41

## 2017-09-26 RX ADMIN — POTASSIUM CHLORIDE 10 MEQ: 10 INJECTION, SOLUTION INTRAVENOUS at 11:30

## 2017-09-26 RX ADMIN — OXYCODONE HYDROCHLORIDE 10 MG: 5 TABLET ORAL at 15:49

## 2017-09-26 RX ADMIN — OXYCODONE HYDROCHLORIDE 10 MG: 5 TABLET ORAL at 01:13

## 2017-09-26 RX ADMIN — ENOXAPARIN SODIUM 40 MG: 40 INJECTION SUBCUTANEOUS at 09:21

## 2017-09-26 RX ADMIN — ACETAMINOPHEN 650 MG: 325 TABLET ORAL at 20:41

## 2017-09-26 RX ADMIN — POTASSIUM CHLORIDE 10 MEQ: 10 INJECTION, SOLUTION INTRAVENOUS at 12:37

## 2017-09-26 RX ADMIN — OXYCODONE HYDROCHLORIDE 10 MG: 5 TABLET ORAL at 19:48

## 2017-09-26 RX ADMIN — OXYCODONE HYDROCHLORIDE 10 MG: 5 TABLET ORAL at 09:23

## 2017-09-26 RX ADMIN — SERTRALINE HYDROCHLORIDE 100 MG: 50 TABLET ORAL at 09:23

## 2017-09-26 RX ADMIN — POTASSIUM CHLORIDE 40 MEQ: 20 TABLET, EXTENDED RELEASE ORAL at 09:24

## 2017-09-26 RX ADMIN — CEFTRIAXONE 1 G: 1 INJECTION, POWDER, FOR SOLUTION INTRAMUSCULAR; INTRAVENOUS at 16:24

## 2017-09-26 RX ADMIN — POTASSIUM CHLORIDE 10 MEQ: 10 INJECTION, SOLUTION INTRAVENOUS at 07:10

## 2017-09-26 RX ADMIN — POTASSIUM CHLORIDE 10 MEQ: 10 INJECTION, SOLUTION INTRAVENOUS at 08:25

## 2017-09-26 RX ADMIN — DOCUSATE SODIUM 100 MG: 100 CAPSULE, LIQUID FILLED ORAL at 09:22

## 2017-09-26 NOTE — PROGRESS NOTES
Spoke w/ Dr. Dario Foster via telephone to make him aware that pt likely has a large volume of hydrocodone/APAP 10/325 mg at home as well a daily temazepam use. These meds were added to the [facility] after he reconciled her meds. I also made him aware of her recent hospitalization at [facility] on narcan gtt for opiate overdose and that I ran a  report on her and scanned it into her EHR. He stated she will likely need something stronger than Norco for post-op pain and will instruct patient to discontinue norco and benzo home meds and to start the oxyir he ordered for discharge. He will also incluce an RX for narcan as well.     [address] [place] [address] Pharmacist  [phone]

## 2017-09-26 NOTE — PROGRESS NOTES
D/C Plan: Noam Vergara [facility] 9/27/17    Pt with a fever over night. Anticipate transfer to [address] [place] [address] tomorrow 9/27/17. Pending medical stability for tomorrow 9/27- [facility] Tammy Hall. RN please call report to [phone] and Please include all hard scripts for controlled substances, med rec and dc summary in packet. Please medicate for pain prior to dc if possible and needed to help offset delay when patient first arrives to facility. Pt will need medical transport to facility, is aware she could incur cost for transport. Please notify pt/family of transport time.   CM remains available.

## 2017-09-26 NOTE — PROGRESS NOTES
Problem: Mobility Impaired (Adult and Pediatric)  Goal: *Acute Goals and Plan of Care (Insert Text)  In 1-7 days pt will be able to perform:  ST. Bed mobility: Rolling L to R to L modified independent for positioning. 2. Supine to sit to supine S with HR for meals. 3. Sit to stand to sit S with RW in prep for ambulation. LT. Gait: Ambulate >150ft S with RW, WBAT, LSO for home/community mobility. 2. Activity tolerance: Tolerate up in chair 1-2 hours for ADLs. 3. Patient/Family Education: Patient/family to be independent with HEP for follow-up care and safe discharge. Outcome: Progressing Towards Goal  PHYSICAL THERAPY TREATMENT     Patient: Mary Singh (38 y.o. female)  Date: 2017  Diagnosis: Fractured Hip  Hip fracture requiring operative repair [facility])  Hip fracture requiring operative repair [facility])  fractured right hip  Hip fracture requiring operative repair ([facility] [address] Fracture of neck of right femur ([facility] [address]  Procedure(s) (LRB):  HIP ARTHROPLASTY FIDE ,  ANTERIOR APPROACH, RIGHT WITH C-ARM (Right) 2 Days Post-Op  Precautions: Back, Fall, WBAT   Chart, physical therapy assessment, plan of care and goals were reviewed. ASSESSMENT:  Some encouragement needed to participate. Pt continues to require assistance with bed mobility due to increase weakness and pain. Pt performed multiple sit-stands with RW Min A. VCs for correct hand placement. Also focused on dynamic sitting activities to include balloon tap, to facilitate reaching outside ANGE and crossing midline. Pt has multiple posterior LOB. CGA to recover. Pt presents with decrease tolerance to activity and fatigues quickly, therefore multiple rest breaks provided. Focused on static standing with RW, performing sorting activity. Standing tolerance/ endurance is limited as pt fatigues quickly, and pt reporting increase pain. Nursing in to provide medications. Pt also able to take side steps to chair.  Applied new brief and cleaned due to incontinence. Rehab strongly recommended due to above deficits and to maximize functional mobility for safe return home. Cont POC. Progression toward goals:  [ ]      Improving appropriately and progressing toward goals  [X]      Improving slowly and progressing toward goals  [ ]      Not making progress toward goals and plan of care will be adjusted       PLAN:  Patient continues to benefit from skilled intervention to address the above impairments. Continue treatment per established plan of care. Discharge Recommendations:  Rehab  Further Equipment Recommendations for Discharge:  rolling walker       SUBJECTIVE:   Patient stated  I want to get back to bed        OBJECTIVE DATA SUMMARY:   Critical Behavior:  Neurologic State: Alert  Orientation Level: Oriented X4  Cognition: Appropriate decision making, Appropriate for age attention/concentration, Appropriate safety awareness, Follows commands  Safety/Judgement: Awareness of environment, Fall prevention  Functional Mobility Training:  Bed Mobility:  Rolling: Contact guard assistance  Supine to Sit: Minimum assistance; Additional time  Scooting: Contact guard assistance  Transfers:  Sit to Stand: Minimum assistance; Additional time  Stand to Sit: Minimum assistance; Additional time  Balance:  Sitting: Intact  Standing: Impaired; With support  Standing - Static: Poor (poor+)  Standing - Dynamic : Poor (poor+)  Ambulation/Gait Training:  Distance (ft): 3 Feet (ft) (side steps to chair )  Assistive Device: Walker, rolling;Gait belt;Brace/Splint  Ambulation - Level of Assistance: Minimal assistance  Gait Abnormalities: Antalgic;Decreased step clearance;Shuffling gait  Right Side Weight Bearing: As tolerated  Base of Support: Shift to left  Stance: Right decreased  Speed/Dorene: Slow;Shuffled  Step Length: Right shortened;Left shortened  Swing Pattern: Right asymmetrical;Left asymmetrical  Interventions: Safety awareness training; Tactile cues; Verbal cues Therapeutic Exercises:            EXERCISE   Sets   Reps   Active Active Assist   Passive Self ROM   Comments   Ankle Pumps 1 10  [X] [ ] [ ] [ ] RLE   Quad Sets/Glut Sets 1 10 [X] [ ] [ ] [ ]     Hamstring Sets     [ ] [ ] [ ] [ ]     Juhi Fleet 1 8 [ ] [X] [ ] [ ]     Heel Slides 1 10 [X] [ ] [ ] [ ]     Jurline Form     [ ] [ ] [ ] [ ]     Hip Abd/Add     [ ] [ ] [ ] [ ]     Corinna Love     [ ] [ ] [ ] [ ]     Walt Distclaudia     [ ] [ ] [ ] [ ]     Sagar Orozco     [ ] [ ] [ ] [ ]           [ ] [ ] [ ] [ ]        Pain:  Pain Scale 1: Numeric (0 - 10)  Pain Intensity 1: 8  Pain Intervention(s) 1: Medication (see MAR)  Activity Tolerance:   Fair      After treatment:   [X] Patient left in no apparent distress sitting up in chair  [ ] Patient left in no apparent distress in bed  [X] Call bell left within reach  [ ] Nursing notified  [ ] Caregiver present  [ ] Bed alarm activated      Terell Santa PTA   Time Calculation: 43 mins

## 2017-09-26 NOTE — ROUTINE PROCESS
Bedside shift change report given to SHANTI Brennan RN (oncoming nurse) by Bud Glez RN (offgoing nurse). Report included the following information SBAR, Kardex, OR Summary, Procedure Summary, Intake/Output, MAR, Recent Results and Med Rec Status.

## 2017-09-26 NOTE — PROGRESS NOTES
Problem: Mobility Impaired (Adult and Pediatric)  Goal: *Acute Goals and Plan of Care (Insert Text)  In 1-7 days pt will be able to perform:  ST. Bed mobility: Rolling L to R to L modified independent for positioning. 2. Supine to sit to supine S with HR for meals. 3. Sit to stand to sit S with RW in prep for ambulation. LT. Gait: Ambulate >150ft S with RW, WBAT, LSO for home/community mobility. 2. Activity tolerance: Tolerate up in chair 1-2 hours for ADLs. 3. Patient/Family Education: Patient/family to be independent with HEP for follow-up care and safe discharge. Outcome: Progressing Towards Goal  PHYSICAL THERAPY TREATMENT     Patient: Renetta Bhatt (45 y.o. female)  Date: 2017  Diagnosis: Fractured Hip  Hip fracture requiring operative repair [facility])  Hip fracture requiring operative repair [facility])  fractured right hip  Hip fracture requiring operative repair ([facility] [address] Fracture of neck of right femur ([facility] [address]  Procedure(s) (LRB):  HIP ARTHROPLASTY FIDE ,  ANTERIOR APPROACH, RIGHT WITH C-ARM (Right) 2 Days Post-Op  Precautions: Back, Fall, WBAT   Chart, physical therapy assessment, plan of care and goals were reviewed. ASSESSMENT:  Pt continues to require physical assistance with all aspects of mobility. Pt requesting to use BSC. Pt able to take side steps to [facility] with RW Min A. VCs for sequencing and correct RW management. Pt void small amount. Pt remained sitting in chair post tx. Cont POC. Progression toward goals:  [ ]      Improving appropriately and progressing toward goals  [X]      Improving slowly and progressing toward goals  [ ]      Not making progress toward goals and plan of care will be adjusted       PLAN:  Patient continues to benefit from skilled intervention to address the above impairments. Continue treatment per established plan of care.   Discharge Recommendations:  Rehab  Further Equipment Recommendations for Discharge:  gait belt and rolling walker       SUBJECTIVE: Patient stated  I will sit up for 45 mins       OBJECTIVE DATA SUMMARY:   Critical Behavior:  Neurologic State: Alert  Orientation Level: Oriented X4  Cognition: Follows commands  Safety/Judgement: Awareness of environment, Fall prevention  Functional Mobility Training:  Bed Mobility:  Rolling: Contact guard assistance  Supine to Sit: Minimum assistance; Additional time   Scooting: Contact guard assistance  Transfers:  Sit to Stand: Minimum assistance; Additional time  Stand to Sit: Minimum assistance; Additional time  Balance:  Sitting: Intact  Standing: Impaired; With support  Standing - Static: Poor (Poor+)  Standing - Dynamic : Poor (Poor+)  Ambulation/Gait Training:  Distance (ft): 3 Feet (ft) (side steps to chair )  Assistive Device: Walker, rolling;Brace/Splint;Gait belt  Ambulation - Level of Assistance: Minimal assistance  Gait Abnormalities: Antalgic;Decreased step clearance;Shuffling gait  Right Side Weight Bearing: As tolerated  Base of Support: Shift to left  Stance: Right decreased  Speed/Dorene: Slow;Shuffled  Step Length: Right shortened;Left shortened  Swing Pattern: Right asymmetrical;Left asymmetrical  Interventions: Verbal cues; Safety awareness training     Pain:  Pain Scale 1: Numeric (0 - 10)  Pain Intensity 1: 7  Pain Location 1: Hip  Pain Orientation 1: Right  Pain Description 1: Aching  Pain Intervention(s) 1: Medication (see MAR)  Activity Tolerance:   Fair      After treatment:   [X] Patient left in no apparent distress sitting up in chair  [ ] Patient left in no apparent distress in bed  [X] Call bell left within reach  [ ] Nursing notified  [ ] Caregiver present  [ ] Bed alarm activated      Fatmata Garnett PTA   Time Calculation: 25 mins

## 2017-09-26 NOTE — PROGRESS NOTES
Hospitalist Progress Note-critical care note     Patient: Renetta Bhatt MRN: [id number]  CSN: [id number]    YOB: 1945  Age: 74 y.o. Sex: female    DOA: 9/23/2017 LOS:  LOS: 3 days            Chief complaint: hip fracture, htn, , hypokalemia , uti       Assessment/Plan         Hospital Problems  Date Reviewed: 9/25/2017          Codes Class Noted POA    Hypomagnesemia ICD-10-CM: E83.42  ICD-9-CM: 275.2  9/25/2017 Unknown        Hypokalemia ICD-10-CM: E87.6  ICD-9-CM: 276.8  9/24/2017 Unknown        * (Principal)Fracture of neck of right femur ([facility] ICD-10-CM: S72.001A  ICD-9-CM: 820.8  9/24/2017 Unknown        Hip fracture requiring operative repair [facility]) ICD-10-CM: S72.009A  ICD-9-CM: 820.8  9/23/2017 Unknown        HTN (hypertension) ICD-10-CM: I10  ICD-9-CM: 401.9  9/23/2017 Unknown        HLD (hyperlipidemia) ICD-10-CM: E78.5  ICD-9-CM: 272.4  9/23/2017 Unknown        Fall ICD-10-CM: W19. Damari Miami  ICD-9-CM: E888.9  9/23/2017 Unknown        UTI (urinary tract infection) ICD-10-CM: N39.0  ICD-9-CM: 599.0  9/23/2017 Unknown        DDD (degenerative disc disease), lumbar ICD-10-CM: M51.36  ICD-9-CM: 722.52  8/22/2017 Yes        Status post lumbar surgery ICD-10-CM: Z98.890  ICD-9-CM: V45.89  8/22/2017 Yes        Malignant melanoma ([facility] ICD-10-CM: C43.9  ICD-9-CM: 172.9  2/22/2016 Yes            1. Mechanical Fall causing right hip fracture   Post surgery   hx of pain medication overdose . Cautious for pain meds   Managed per primary team     2. UTI   fever post surgery , bcx no growth so far. Will continue rocephin  ecoli -cx from  Out-side and sensitive to all abx   3. Leukocytosis, likely from UTI and reactive   Resolved   4. HTN  Continue home medication   5. HLD  On statin   6. Hx of Melanoma   7.  Hx of Mastectomy due to breast cancer   8 hypokalemia   k replaced  DNR    Subjective: feel fine   Nurse; no acute issue , hx of pain meds over dose   Pt dose not complained of pain am and hx of pain meds overdose. Not sure family bring pain meds from home or not, narcan on board. Can be d/c to rehab if remained afebrile overnight     Review of systems:    General: No fevers or chills. Cardiovascular: No chest pain or pressure. No palpitations. Pulmonary: No shortness of breath. Gastrointestinal: No nausea, vomiting. Vital signs/Intake and Output:  Visit Vitals    /57 (BP 1 Location: Right arm, BP Patient Position: At rest)    Pulse 83    Temp 97.6 °F (36.4 °C)    Resp 15    Wt 59.4 kg (131 lb)    SpO2 98%    BMI 23.21 kg/m2     Current Shift:  09/26 0701 - 09/26 1900  In: 120 [P.O.:120]  Out: 300 [Urine:300]  Last three shifts:  09/24 1901 - 09/26 0700  In: 240 [P.O.:240]  Out: 2700 [Urine:2700]    Physical Exam:  General: WD, WN. Alert, cooperative, no acute distress    HEENT: NC, Atraumatic. PERRLA, anicteric sclerae. Lungs: CTA Bilaterally. No Wheezing/Rhonchi/Rales. Heart:  Regular  rhythm,  + murmur, No Rubs, No Gallops  Abdomen: Soft, Non distended, Non tender.  +Bowel sounds,   Extremities: No c/c. Rt hip covered with gauze. Psych:   Not anxious or agitated. Neurologic:  No acute neurological deficit. Labs: Results:       Chemistry Recent Labs      09/26/17   0526  09/25/17   0602  09/23/17   2315   GLU  107*  120*  126*   NA  136  142  138   K  2.9*  3.6  3.2*   CL  99*  103  102   CO2  30  31  27   BUN  10  9  10   CREA  0.89  0.99  0.95   CA  7.2*  7.1*  8.0*   AGAP  7  8  9   BUCR  11*  9*  11*   AP   --    --   100   TP   --    --   6.6   ALB   --    --   2.7*   GLOB   --    --   3.9   AGRAT   --    --   0.7*      CBC w/Diff Recent Labs      09/24/17   1442  09/23/17   2315   WBC   --   7.7   RBC   --   2.67*   HGB  8.6*  8.0*   HCT  26.0*  24.7*   PLT   --   280   GRANS   --   80*   LYMPH   --   12*   EOS   --   1      Cardiac Enzymes No results for input(s): CPK, CKND1, JESSICA in the last 72 hours.     No lab exists for component: CKRMB, TROIP   Coagulation Recent Labs      09/23/17 2315   PTP  13.5   INR  1.1       Lipid Panel No results found for: CHOL, CHOLPOCT, CHOLX, CHLST, CHOLV, 726052, HDL, LDL, LDLC, DLDLP, 572776, VLDLC, VLDL, TGLX, TRIGL, TRIGP, TGLPOCT, CHHD, CHHDX   BNP No results for input(s): BNPP in the last 72 hours.    Liver Enzymes Recent Labs      09/23/17 2315   TP  6.6   ALB  2.7*   AP  100   SGOT  39*      Thyroid Studies No results found for: T4, T3U, TSH, TSHEXT, TSHEXT     Procedures/imaging: see electronic medical records for all procedures/Xrays and details which were not copied into this note but were reviewed prior to creation of Shasha Blackwell MD

## 2017-09-26 NOTE — PROGRESS NOTES
Admission Medication Reconciliation was  performed yesterday on this patient directly admitted to  over the weekend consisting of interview of the patient regarding their PTA Home Medication List, Allergies and PMH as well as obtaining outpatient pharmacy information. Interviewed patient who was not a good historian. Patient did not provide a written list of home medications. Patient's outpatient pharmacy is reported as [facility] but a  run on pt also shows many meds at [facility] as well. Smoking status is 1 ppd. Pt declined nicotine patch  Alcohol use denies  Illicit drug use denies  Patient ABX use within the past 30 days = ? Has patient received any antineoplastics in the past 30 days? na  Has patient received any radiation treatments in the past 45 days? na      Medication Reconciliation Interventions:   Wrong Medication Identified 0  Wrong/missing medication strength or dose identified  0  Wrong/missing Interval Identified 1  Wrong/missing Route Identified 0  Medication Duplication 0  Omissions 4  Commissions 0  Other Issue(s) Identified (Indicate): 0            Medication Compliance Issues and/or Medication Concerns:    1. There seems to be some concern as to the amount of narcotics pt is receiving outpatient. I spoke w/ Sonda Lesches at [place] [address] who reported her suspicions about the family and possible diversion and enabling. Evidently the patient's  Ruslan Gracia is also getting #110 Hydrocodone/APAP 10/325mg every 30 days from a prescriber Charu Espinoza. (a different practice than the one this patient sees)  2. Given the patient's age and her concurrent use of opiates and benzodiazepines I am concerned that she may experience another overdose episode. I discussed Narcan nasal spray with her to which she stated \"you can give me one but I'll tell you right now I'm not going to use it\". 3.  [facility] and scanned into chart.   4.  It appears pt is being discharged with a prescription for  #54 oxyir 10mg tablets.     4 [facility] Pharmacist  [phone]

## 2017-09-26 NOTE — PROGRESS NOTES
Progress Note      Patient: Hannah Arechiga               Sex: female          DOA: 9/23/2017       YOB: 1945      Age:  74 y.o.        LOS:  LOS: 3 days     Status Post: Procedure(s):  HIP ARTHROPLASTY FIDE ,  ANTERIOR APPROACH, RIGHT WITH C-ARM  Surgery Date: 9/24/2017            Subjective:     Hannah Arechiga is a 74 y.o. female who c/o right hip pain reasonably well controlled by prn pain medication. Objective:      Visit Vitals    /52 (BP 1 Location: Right arm, BP Patient Position: At rest)    Pulse 84    Temp (!) 101.6 °F (38.7 °C)    Resp 14    Wt 59.4 kg (131 lb)    SpO2 96%    BMI 23.21 kg/m2       Physical Exam:   Dressing:  clean, dry, intact - same as POD # 1  Wiggles Toes/Ankle  Foot sensation intact to light touch  No foot edema/ +1 Posterior Tibial Pulse    Intake and Output:  Current Shift:     Last three shifts:  09/24 1901 - 09/26 0700  In: 240 [P.O.:240]  Out: 2700 [Urine:2700]  Voiding Status:  Voiding without need for a Holden Catheter    Lab/Data Reviewed:  Recent Labs      09/26/17   0526   09/24/17   1442  09/23/17   2315   HGB   --    --   8.6*  8.0*   HCT   --    --   26.0*  24.7*   INR   --    --    --   1.1   NA  136   < >   --   138   K  2.9*   < >   --   3.2*   CL  99*   < >   --   102   CO2  30   < >   --   27   BUN  10   < >   --   10   CREA  0.89   < >   --   0.95   GLU  107*   < >   --   126*    < > = values in this interval not displayed.        Medications Reviewed    Assessment/Plan     Principal Problem:    Fracture of neck of right femur ([facility] (9/24/2017)    Active Problems:    Malignant melanoma ([facility] (2/22/2016)      DDD (degenerative disc disease), lumbar (8/22/2017)      Status post lumbar surgery (8/22/2017)      Hip fracture requiring operative repair ([facility] (9/23/2017)      HTN (hypertension) (9/23/2017)      HLD (hyperlipidemia) (9/23/2017)      Fall (9/23/2017)      UTI (urinary tract infection) (9/23/2017)      Hypokalemia (9/24/2017)      Hypomagnesemia (9/25/2017)        · Discharge Planning for rehab. · Continue DVT Prophylaxis. · Reviewed note of prior hx of opioid misuse in the past, caution with pain medications. The patient was seen and examined by Dr. Emily Sanchez today as well and he is in agreement with above.

## 2017-09-26 NOTE — ROUTINE PROCESS
Bedside shift change report given to Fuad Rush Rn (oncoming nurse) by Mellisa Byrd Rn (offgoing nurse). Report included the following information SBAR, Kardex, Procedure Summary, Intake/Output, MAR and Recent Results. Shift summary: Uneventful shift, patient medicated for pain x2. No complaints. On coming nurse was told about pharmacy wanting to speak to ortho before discharge of patient. Potassium runs started.      Patient Vitals for the past 12 hrs:   Temp Pulse Resp BP SpO2   09/26/17 0521 (!) 101.6 °F (38.7 °C) 84 14 110/52 96 %   09/26/17 0100 100.4 °F (38 °C) 91 14 118/55 95 %   09/25/17 2311 98.4 °F (36.9 °C) 89 16 119/64 100 %

## 2017-09-26 NOTE — PROGRESS NOTES
Pt alert and awake with c/o pain to right leg/hip. PRN meds given as ordered. Assessment complete. Call light in reach, safety and comfort measures are in place.

## 2017-09-27 ENCOUNTER — APPOINTMENT (OUTPATIENT)
Dept: GENERAL RADIOLOGY | Age: 72
DRG: 470 | End: 2017-09-27
Attending: HOSPITALIST
Payer: MEDICARE

## 2017-09-27 VITALS
TEMPERATURE: 97.9 F | OXYGEN SATURATION: 97 % | HEART RATE: 77 BPM | DIASTOLIC BLOOD PRESSURE: 60 MMHG | BODY MASS INDEX: 23.2 KG/M2 | RESPIRATION RATE: 15 BRPM | WEIGHT: 130.95 LBS | SYSTOLIC BLOOD PRESSURE: 122 MMHG

## 2017-09-27 LAB
ABO + RH BLD: NORMAL
ANION GAP SERPL CALC-SCNC: 8 MMOL/L (ref 3–18)
BLD PROD TYP BPU: NORMAL
BLD PROD TYP BPU: NORMAL
BLOOD GROUP ANTIBODIES SERPL: NORMAL
BPU ID: NORMAL
BPU ID: NORMAL
BUN SERPL-MCNC: 15 MG/DL (ref 7–18)
BUN/CREAT SERPL: 15 (ref 12–20)
CALCIUM SERPL-MCNC: 8 MG/DL (ref 8.5–10.1)
CALLED TO:,BCALL1: NORMAL
CHLORIDE SERPL-SCNC: 102 MMOL/L (ref 100–108)
CO2 SERPL-SCNC: 28 MMOL/L (ref 21–32)
CREAT SERPL-MCNC: 0.98 MG/DL (ref 0.6–1.3)
CROSSMATCH RESULT,%XM: NORMAL
CROSSMATCH RESULT,%XM: NORMAL
GLUCOSE SERPL-MCNC: 98 MG/DL (ref 74–99)
MAGNESIUM SERPL-MCNC: 2 MG/DL (ref 1.6–2.6)
POTASSIUM SERPL-SCNC: 3.7 MMOL/L (ref 3.5–5.5)
SODIUM SERPL-SCNC: 138 MMOL/L (ref 136–145)
SPECIMEN EXP DATE BLD: NORMAL
STATUS OF UNIT,%ST: NORMAL
STATUS OF UNIT,%ST: NORMAL
UNIT DIVISION, %UDIV: 0
UNIT DIVISION, %UDIV: 0

## 2017-09-27 PROCEDURE — 74011250636 HC RX REV CODE- 250/636: Performed by: ORTHOPAEDIC SURGERY

## 2017-09-27 PROCEDURE — 74011250637 HC RX REV CODE- 250/637: Performed by: PHYSICIAN ASSISTANT

## 2017-09-27 PROCEDURE — 80048 BASIC METABOLIC PNL TOTAL CA: CPT | Performed by: HOSPITALIST

## 2017-09-27 PROCEDURE — 74011250637 HC RX REV CODE- 250/637: Performed by: ORTHOPAEDIC SURGERY

## 2017-09-27 PROCEDURE — 74011250637 HC RX REV CODE- 250/637: Performed by: HOSPITALIST

## 2017-09-27 PROCEDURE — 83735 ASSAY OF MAGNESIUM: CPT | Performed by: HOSPITALIST

## 2017-09-27 PROCEDURE — 77010033678 HC OXYGEN DAILY

## 2017-09-27 PROCEDURE — 36415 COLL VENOUS BLD VENIPUNCTURE: CPT | Performed by: HOSPITALIST

## 2017-09-27 PROCEDURE — 71010 XR CHEST PORT: CPT

## 2017-09-27 RX ORDER — HYDROCODONE BITARTRATE AND ACETAMINOPHEN 10; 325 MG/1; MG/1
1 TABLET ORAL
Status: DISCONTINUED | OUTPATIENT
Start: 2017-09-27 | End: 2017-09-27 | Stop reason: HOSPADM

## 2017-09-27 RX ORDER — HYDROCODONE BITARTRATE AND ACETAMINOPHEN 10; 325 MG/1; MG/1
1 TABLET ORAL
Qty: 54 TAB | Refills: 0 | Status: SHIPPED | OUTPATIENT
Start: 2017-09-27 | End: 2022-10-06 | Stop reason: CLARIF

## 2017-09-27 RX ADMIN — ENOXAPARIN SODIUM 40 MG: 40 INJECTION SUBCUTANEOUS at 09:01

## 2017-09-27 RX ADMIN — DOCUSATE SODIUM 100 MG: 100 CAPSULE, LIQUID FILLED ORAL at 09:00

## 2017-09-27 RX ADMIN — SERTRALINE HYDROCHLORIDE 100 MG: 50 TABLET ORAL at 09:00

## 2017-09-27 RX ADMIN — ATORVASTATIN CALCIUM 20 MG: 20 TABLET, FILM COATED ORAL at 09:01

## 2017-09-27 RX ADMIN — HYDROCODONE BITARTRATE AND ACETAMINOPHEN 1 TABLET: 10; 325 TABLET ORAL at 09:01

## 2017-09-27 RX ADMIN — FERROUS SULFATE TAB 325 MG (65 MG ELEMENTAL FE) 325 MG: 325 (65 FE) TAB at 09:01

## 2017-09-27 RX ADMIN — AMLODIPINE BESYLATE 10 MG: 5 TABLET ORAL at 09:01

## 2017-09-27 NOTE — DISCHARGE SUMMARY
DISCHARGE SUMMARY    Patient: Brijesh Cat MRN: [id number]  CSN: [id number]    YOB: 1945  Age: 74 y.o. Sex: female              Admit Date: 9/23/2017    Discharge Date:     Admission Diagnoses: Fractured Hip  Hip fracture requiring operative repair [facility])  Hip fracture requiring operative repair [facility])  fractured right hip  Hip fracture requiring operative repair [facility])    Discharge Diagnoses:    Problem List as of 9/27/2017  Date Reviewed: 9/25/2017          Codes Class Noted - Resolved    Hypomagnesemia ICD-10-CM: E83.42  ICD-9-CM: 275.2  9/25/2017 - Present        Hypokalemia ICD-10-CM: E87.6  ICD-9-CM: 276.8  9/24/2017 - Present        * (Principal)Fracture of neck of right femur ([facility] ICD-10-CM: S72.001A  ICD-9-CM: 820.8  9/24/2017 - Present        Hip fracture requiring operative repair [facility]) ICD-10-CM: H68.801Y  ICD-9-CM: 820.8  9/23/2017 - Present        HTN (hypertension) ICD-10-CM: I10  ICD-9-CM: 401.9  9/23/2017 - Present        HLD (hyperlipidemia) ICD-10-CM: E78.5  ICD-9-CM: 272.4  9/23/2017 - Present        Fall ICD-10-CM: W19. Karen Galea  ICD-9-CM: E888.9  9/23/2017 - Present        UTI (urinary tract infection) ICD-10-CM: N39.0  ICD-9-CM: 599.0  9/23/2017 - Present        DDD (degenerative disc disease), lumbar ICD-10-CM: M51.36  ICD-9-CM: 722.52  8/22/2017 - Present        Status post lumbar surgery ICD-10-CM: Z98.890  ICD-9-CM: V45.89  8/22/2017 - Present        Malignant melanoma ([facility] ICD-10-CM: C43.9  ICD-9-CM: 172.9  2/22/2016 - Present        RESOLVED: Spinal stenosis, lumbar ICD-10-CM: M48.06  ICD-9-CM: 724.02  8/22/2017 - 8/29/2017        RESOLVED: HNP (herniated nucleus pulposus), lumbar ICD-10-CM: M51.26  ICD-9-CM: 722.10  8/22/2017 - 8/29/2017              Discharge Condition: Good    Hospital Course: On the day of admission the patient underwent a Right Hip hemiarthroplasty that was done under general anesthesia without complications.  The patient was started on Lovenox 40 mg SQ daily postoperatively for anti embolism prophylaxis. The patient was voiding spontaneously without need for a Holden catheter. The patient was also started preoperatively on Ancef  that was continued for 2 more doses IV q8h for infection prophylaxis. Physical Therapy was begun postoperatively with patient weight bearing as tolerated. On POD # 1 patient's dressing was clean, dry and intact. Patient was able to wiggle their toes and ankle with sensation intact to light touch. The patient had no edema with a present 2+ posterior tibialis pulse. Patient's leg lengths appear equal. X-rays done of the patient's hip do show good positioning of the components. On POD #2 patient's physical exam was unchanged. Patient's incision was examined and looked good without signs or symptoms of infection. Patient's dressing was reapplied. On POD # 3 the patient is orthopaedically and medically stable they will be discharged to Rehab. Patients hemoglobins were   Recent Labs      09/24/17   1442   HGB  8.6*   . Patient temp max was. Temp (72hrs), Av.2 °F (37.3 °C), Min:97.6 °F (36.4 °C), Max:102.5 °F (39.2 °C)  . Discharge Medications:     Current Discharge Medication List      START taking these medications    Details   naloxone (NARCAN) 4 mg/actuation nasal spray Use 1 spray intranasally into 1 nostril. Use a new Narcan nasal spray for subsequent doses and administer into alternating nostrils. May repeat every 2 to 3 minutes as needed. Qty: 2 Each, Refills: 0      oxyCODONE IR (ROXICODONE) 10 mg tab immediate release tablet Take 1 Tab by mouth every six (6) hours as needed. Max Daily Amount: 40 mg.  Qty: 54 Tab, Refills: 0      ferrous sulfate 325 mg (65 mg iron) tablet Take 1 Tab by mouth two (2) times daily (with meals). Qty: 60 Tab, Refills: 0      enoxaparin (LOVENOX) 40 mg/0.4 mL 0.4 mL by SubCUTAneous route daily.   Qty: 21 Syringe, Refills: 0         CONTINUE these medications which have CHANGED    Details HYDROcodone-acetaminophen (NORCO)  mg tablet Take 1 Tab by mouth every six (6) hours as needed. Max Daily Amount: 4 Tabs. Qty: 54 Tab, Refills: 0      docusate sodium (COLACE) 100 mg capsule Take 1 Cap by mouth two (2) times a day for 90 days. Qty: 60 Cap, Refills: 2         CONTINUE these medications which have NOT CHANGED    Details   lisinopril (PRINIVIL, ZESTRIL) 20 mg tablet Take 20 mg by mouth daily. temazepam (RESTORIL) 30 mg capsule Take 30 mg by mouth nightly. aspirin delayed-release 81 mg tablet Take 81 mg by mouth daily. diazePAM (VALIUM) 5 mg tablet Take 1 Tab by mouth every eight (8) hours as needed. Max Daily Amount: 15 mg.  Qty: 60 Tab, Refills: 0      atorvastatin (LIPITOR) 20 mg tablet Take 20 mg by mouth daily. amLODIPine (NORVASC) 10 mg tablet Take 10 mg by mouth daily. Indications: HYPERTENSION      sertraline (ZOLOFT) 100 mg tablet Take 100 mg by mouth daily. Indications: ANXIETY WITH DEPRESSION         STOP taking these medications       HYDROmorphone (DILAUDID) 4 mg tablet Comments:   Reason for Stopping:               Activity: Activity as tolerated, Ambulate in house, No heavy lifting, pushing, pulling with the implant side for 2 months, No driving while on analgesics and PT/OT Eval and Treat  Patient has no hip precautions.     Diet: Regular Diet    Wound Care: Keep wound clean and dry, Reinforce dressing PRN, Ice to area for comfort and As directed    Follow-up: Two weeks post-op in the office for X-rays of the Right hip and another clinical exam.

## 2017-09-27 NOTE — ROUTINE PROCESS
Bedside and Verbal shift change report given to JANIS Mahoney RN  (oncoming nurse) by  ELIZABET Gresham RN  (offgoing nurse). Report included the following information SBAR, Kardex, Recent Results and Med Rec Status. For  this am at 0900 by Medical Transport communicated to next shift.

## 2017-09-27 NOTE — PROGRESS NOTES
0900: Assessment completed. Patient is A&O 4. Patient is cooperative. Patient PERRLA, oral mucosa pink and moist, strong  on both upper extremities. Lung sound clear, not appear distress. Bowel sounds active. Pedal pulses are palpable, no complain of any numbness or tingling. IV site dry and dressing intact. Mepilex dressing to right hip is clean and dry. SCD is applied bilateral. Pain is 7/10. PRN medication is given per [facility]. Refused to applied Ice and order breakfast. bed is in lower position, call bell is within reach. 0930: Pt is transported to rehab by medical transport at this time. 9729: Verbal and Telephone report given to Ruslan Lewis LPN at [facility] [address] home by Yifan Harris RN  Report included the following information SBAR, OR Summary, Intake/Output and MAR.

## 2017-09-27 NOTE — PROGRESS NOTES
Hospitalist Progress Note-critical care note     Patient: Shady Gray MRN: [id number]  CSN: [id number]    YOB: 1945  Age: 74 y.o. Sex: female    DOA: 9/23/2017 LOS:  LOS: 4 days            Chief complaint: hip fracture, htn, , hypokalemia , uti       Assessment/Plan         Hospital Problems  Date Reviewed: 9/25/2017          Codes Class Noted POA    Hypomagnesemia ICD-10-CM: E83.42  ICD-9-CM: 275.2  9/25/2017 Unknown        Hypokalemia ICD-10-CM: E87.6  ICD-9-CM: 276.8  9/24/2017 Unknown        * (Principal)Fracture of neck of right femur ([facility] ICD-10-CM: S72.001A  ICD-9-CM: 820.8  9/24/2017 Unknown        Hip fracture requiring operative repair [facility]) ICD-10-CM: S72.009A  ICD-9-CM: 820.8  9/23/2017 Unknown        HTN (hypertension) ICD-10-CM: I10  ICD-9-CM: 401.9  9/23/2017 Unknown        HLD (hyperlipidemia) ICD-10-CM: E78.5  ICD-9-CM: 272.4  9/23/2017 Unknown        Fall ICD-10-CM: W19. Jeff Russell  ICD-9-CM: E888.9  9/23/2017 Unknown        UTI (urinary tract infection) ICD-10-CM: N39.0  ICD-9-CM: 599.0  9/23/2017 Unknown        DDD (degenerative disc disease), lumbar ICD-10-CM: M51.36  ICD-9-CM: 722.52  8/22/2017 Yes        Status post lumbar surgery ICD-10-CM: Z98.890  ICD-9-CM: V45.89  8/22/2017 Yes        Malignant melanoma ([facility] ICD-10-CM: C43.9  ICD-9-CM: 172.9  2/22/2016 Yes            1. Mechanical Fall causing right hip fracture   Post surgery   hx of pain medication overdose . Cautious for pain meds   Managed per primary team     2. UTI   fever post surgery , bcx no growth so far. Will continue rocephin  ecoli -cx from  Out-side and sensitive to all abx   3. Leukocytosis, likely from UTI and reactive   Resolved   4. HTN  Continue home medication   5. HLD  On statin   6. Hx of Melanoma   7.  Hx of Mastectomy due to breast cancer   8 hypokalemia   k replaced  DNR    Subjective: I do not want to be d/c   Nurse; no acute issue       Review of systems:    General: No fevers or chills. Cardiovascular: No chest pain or pressure. No palpitations. Pulmonary: No shortness of breath. Gastrointestinal: No nausea, vomiting. Vital signs/Intake and Output:  Visit Vitals    /60    Pulse 77    Temp 97.9 °F (36.6 °C)    Resp 15    Wt 59.4 kg (130 lb 15.3 oz)    SpO2 97%    BMI 23.2 kg/m2     Current Shift:  09/27 0701 - 09/27 1900  In: -   Out: 150 [Urine:150]  Last three shifts:  09/25 1901 - 09/27 0700  In: 120 [P.O.:120]  Out: 425 [Urine:425]    Physical Exam:  General: WD, WN. Alert, cooperative, no acute distress    HEENT: NC, Atraumatic. PERRLA, anicteric sclerae. Lungs: CTA Bilaterally. No Wheezing/Rhonchi/Rales. Heart:  Regular  rhythm,  + murmur, No Rubs, No Gallops  Abdomen: Soft, Non distended, Non tender.  +Bowel sounds,   Extremities: No c/c. Rt hip covered with gauze. Psych:   Not anxious or agitated. Neurologic:  No acute neurological deficit. Labs: Results:       Chemistry Recent Labs      09/27/17   0403  09/26/17   0526  09/25/17   0602   GLU  98  107*  120*   NA  138  136  142   K  3.7  2.9*  3.6   CL  102  99*  103   CO2  28  30  31   BUN  15  10  9   CREA  0.98  0.89  0.99   CA  8.0*  7.2*  7.1*   AGAP  8  7  8   BUCR  15  11*  9*      CBC w/Diff Recent Labs      09/24/17   1442   HGB  8.6*   HCT  26.0*      Cardiac Enzymes No results for input(s): CPK, CKND1, JESSICA in the last 72 hours. No lab exists for component: CKRMB, TROIP   Coagulation No results for input(s): PTP, INR, APTT in the last 72 hours. No lab exists for component: INREXT, INREXT    Lipid Panel No results found for: CHOL, CHOLPOCT, CHOLX, CHLST, CHOLV, 107032, HDL, LDL, LDLC, DLDLP, 009893, VLDLC, VLDL, TGLX, TRIGL, TRIGP, TGLPOCT, CHHD, CHHDX   BNP No results for input(s): BNPP in the last 72 hours. Liver Enzymes No results for input(s): TP, ALB, TBIL, AP, SGOT, GPT in the last 72 hours.     No lab exists for component: DBIL   Thyroid Studies No results found for: T4, T3U, TSH, TSHEXT, TSHEXT     Procedures/imaging: see electronic medical records for all procedures/Xrays and details which were not copied into this note but were reviewed prior to creation of Claudia Ackerman MD

## 2017-09-27 NOTE — PROGRESS NOTES
Shift Summary ; Slept well during the shift without complaints or acute distress. Used bed pan with encouragement to get up. No elevated temperatrure. Mepilex dressing to right swollen hip area intact. For discharge today. DNR status maintained.

## 2017-09-27 NOTE — PROGRESS NOTES
Progress Note      Patient: Baron Glaser               Sex: female          DOA: 9/23/2017       YOB: 1945      Age:  74 y.o.        LOS:  LOS: 4 days     Status Post: Procedure(s):  HIP ARTHROPLASTY FIDE ,  ANTERIOR APPROACH, RIGHT WITH C-ARM  Surgery Date: 9/24/2017            Subjective:     Baron Glaser is a 74 y.o. female who c/o right hip pain reasonably well controlled with prn pain medication. Objective:      Visit Vitals    /56 (BP 1 Location: Right arm, BP Patient Position: At rest)    Pulse 72    Temp 98.2 °F (36.8 °C)    Resp 14    Wt 59.4 kg (130 lb 15.3 oz)    SpO2 98%    BMI 23.2 kg/m2       Physical Exam:   Dressing:  clean, dry, intact - same as POD # 1  Wiggles Toes/Ankle  Foot sensation intact to light touch  No foot edema/ +1 Posterior Tibial Pulse    Intake and Output:  Current Shift:  09/26 1901 - 09/27 0700  In: -   Out: 25 [Urine:25]  Last three shifts:  09/25 0701 - 09/26 1900  In: 120 [P.O.:120]  Out: 1200 [Urine:1200]  Voiding Status:  Voiding without need for a Holden Catheter    Lab/Data Reviewed:  Recent Labs      09/27/17   0403   09/24/17   1442   HGB   --    --   8.6*   HCT   --    --   26.0*   NA  138   < >   --    K  3.7   < >   --    CL  102   < >   --    CO2  28   < >   --    BUN  15   < >   --    CREA  0.98   < >   --    GLU  98   < >   --     < > = values in this interval not displayed.        Medications Reviewed    Assessment/Plan     Principal Problem:    Fracture of neck of right femur ([facility] (9/24/2017)    Active Problems:    Malignant melanoma ([address] (2/22/2016)      DDD (degenerative disc disease), lumbar (8/22/2017)      Status post lumbar surgery (8/22/2017)      Hip fracture requiring operative repair ([facility] (9/23/2017)      HTN (hypertension) (9/23/2017)      HLD (hyperlipidemia) (9/23/2017)      Fall (9/23/2017)      UTI (urinary tract infection) (9/23/2017)      Hypokalemia (9/24/2017)      Hypomagnesemia (9/25/2017)        · Discharge Planning for rehab today. · Continue DVT Prophylaxis. · D/C to rehab today if cleared by physical therapy. · D/C Oxycodone, printed Norco 10-325mg and Narcan        The patient was seen and examined by Dr. Christopher Benito today as well and he is in agreement with above.

## 2017-09-30 LAB
BACTERIA SPEC CULT: NORMAL
SERVICE CMNT-IMP: NORMAL

## 2018-05-10 ENCOUNTER — APPOINTMENT (OUTPATIENT)
Dept: INFUSION THERAPY | Age: 73
End: 2018-05-10

## 2018-05-11 ENCOUNTER — HOSPITAL ENCOUNTER (OUTPATIENT)
Dept: INFUSION THERAPY | Age: 73
End: 2018-05-11

## 2018-07-13 NOTE — PROGRESS NOTES
Spoke to patient regarding planned procedure with radiologist and Dr. Janeth Simpson scheduled for 7/19/18. Pt has been made aware per interventional radiologist's request (Dr. Angy James) to stop Plavix x 3 days prior to Ultrasound localization of right axillary lymph node procedure that will occure prior to her OR procedure with Dr. Janeth Simpson. Informed pt that she is to follow all other instructions received regarding arrival time/npo status/etc from Dr. Luc Prado office/service. Pt verbalized understanding and compliance. Gave pt THE [facility] radiology RN phone number [phone].

## 2018-07-16 NOTE — PROGRESS NOTES
Spoke to [facility], surgery scheduler, at Dr. Jorge Regalado office. Informed Leilani that [address] is scheduled in ultrasound dept for 0900 on Thursday, 7/19/19, prior to her surgery. [facility] stated that she will inform pt to arrive to surgical pavilion at 0700 that morning. Called Pre-op and inform preop RN of pt's planned arrival at 0700 with a scheduled 0900 appt in ultrasound dept for US Loc procedure w/radiologist with surgery to follow in OR w/Dr. Juan Baker.

## 2018-07-17 NOTE — H&P
PATIENT: Anjelica Rich  YOB: 1945  DATE: 2018 9:00 AM   VISIT TYPE: Office Visit  _____________________________________________________________    Completed Orders (this encounter)  Order Interpretation Result Next Lab Date   surgery instructions given education        Assessment/Plan  # Detail Type Description    1. Assessment Neoplasm of unspecified behavior of other specified sites (D49.89). Impression H/O malignant melanoma R buttock with abnl PET scan showing likel LN right lateral chest wall, discussed excision in OR, this will need to be localized with LN mapping. Patient Plan excision R chest wall LN's    Plan Orders Basic Metabolic Panel (8) AU to be performed, Hematocrit to be performed and Hemoglobin to be performed. 2. Other Orders Orders not associated to today's assessments. Plan Orders Further diagnostic evaluations ordered today include(s) ELECTROCARDIOGRAM, COMPLETE to be performed. This 74year old female presents for abnormal PET scan. History of Present Illness:  1.  abnormal PET scan   The symptoms began 1 month ago and generally lasts varies. The symptoms are reported as being mild. The symptoms occur daily. The location is right lateral chest wall. The context of the symptoms include h/o malignant melanoma R buttock. The symptoms are described as nonpalpable. Aggravating factors include nothing. Relieving factors include nothing. She states the symptoms are acute and are of new onset.  5 mm area on PET scan                    PAST MEDICAL/SURGICAL HISTORY  (Reviewed, updated)    Disease/disorder Onset Date Management Date Comments   Cancer, breast 1999      Melanoma         Family History  (Reviewed, updated)  Relationship Family Member Name  Age at Death Condition Onset Age Cause of Death       Family history of Heart disease  N       Family history of Hypertension  N       Family history of Diabetes mellitus  N       Family history of Cancer, unknown  N       Social History:  (Reviewed, updated)  Tobacco use reviewed. Preferred language is [place]. MARITAL STATUS/FAMILY/SOCIAL SUPPORT  Currently . Smoking status: Current every day smoker. SMOKING STATUS  Use Status Type Smoking Status Usage Per Day Years Used Total Pack Years   yes Cigarette Current every day smoker 0.5 Cigarettes             Medications (active prior to today)  Medication Instructions Start Date Stop Date Refilled Elsewhere   amlodipine 10 mg tablet take 1 tablet by oral route  every day 12/21/2015   N   pravastatin 40 mg tablet take 1 tablet by oral route  every day 12/21/2015   N   sertraline 100 mg tablet take 1 tablet by oral route  every day 12/21/2015   N   Vicodin HP 10 mg-300 mg tablet take 1 tablet by oral route  every 6 hours as needed 12/21/2015   N   hydrochlorothiazide 25 mg tablet take 1 tablet by oral route  every day 12/21/2015   N     Medication Reconciliation  Medications reconciled today. Medication Reviewed  Adherence Medication Name Sig Desc Elsewhere Status   taking as directed amlodipine 10 mg tablet take 1 tablet by oral route  every day N Verified   taking as directed pravastatin 40 mg tablet take 1 tablet by oral route  every day N Verified   taking as directed sertraline 100 mg tablet take 1 tablet by oral route  every day N Verified   taking as directed Vicodin HP 10 mg-300 mg tablet take 1 tablet by oral route  every 6 hours as needed N Verified   taking as directed hydrochlorothiazide 25 mg tablet take 1 tablet by oral route  every day N Verified     Allergies:  Ingredient Reaction (Severity) Medication Name Comment   IOVERSOL Unknown     NAPROXEN SODIUM  Aleve    Reviewed, no changes. Review of Systems  System Neg/Pos Details   Constitutional Negative Fever, Night sweats and Weight loss. ENMT Negative Hearing loss, Tinnitus, Vertigo and Voice change. Eyes Negative Diplopia and Vision loss.    Respiratory Positive Wheezing. Respiratory Negative Asthma, Cough, Dyspnea, Hemoptysis and Known TB exposure. Cardio Negative Chest pain, Claudication, Edema, Irregular heartbeat/palpitations and Thrombophlebitis. GI Negative Bloating, Dysphagia, Hemorrhoids, Jaundice and Reflux.  Negative Dysuria, Nocturia, Passage stone/gravel and Urinary incontinence. Endocrine Negative Cold intolerance and Goiter. Neuro Negative Focal weakness, Headache, Paresthesia, Seizures and Syncope. Integumentary Negative Change in shape/size of mole(s) and Skin lesion. MS Positive Bone/joint symptoms. MS Negative Back pain and Muscle weakness. Hema/Lymph Negative Easy bleeding and Easy bruising. Allergic/Immuno Negative Contact allergy and Contact dermatitis. Vital Signs       Height  Time ft in cm Last Measured Height Position   9:18 AM 5.0 3.00 160.02 07/02/2018 0     Weight/BSA/BMI  Time lb oz kg Context BMI kg/m2 BSA m2   9:18 .00  57.153 dressed with shoes 22.32      Blood Pressure  Time BP mm/Hg Position Side Site Method Cuff Size   9:18 /90 sitting right arm automatic adult     Temperature/Pulse/Respiration  Time Temp F Temp C Temp Site Pulse/min Pattern Resp/ min   9:18 AM   ear 86 regular      Measured By  Time Measured by   9:18 AM Blondie Mole       Physical Exam:  Exam Findings Details   Constitutional * Nourishment - thin. Overall appearance - chronically ill-appearing, older than stated age. Constitutional Normal No acute distress. Eyes Normal General - Right: Normal, Left: Normal. Sclera - Right: Normal, Left: Normal.   Neck Exam Normal Inspection - Normal.   Lymph Detail Normal Supraclavicular. Axillary. Inguinal. Femoral.   Respiratory Comments no palpable chest wall mass   Respiratory Normal Cough - Absent. Effort - Normal.   Cardiovascular Normal Heart rate - Regular rate. Rhythm - Regular. Skin * Body areas examined - Buttocks. Detailed inspection - Visual lesions: scar(s), Side: right. Musculoskeletal * Gait - limp. Extremity * Cyanosis. Extremity Normal No Ulceration. Neurological Normal Level of consciousness - Normal. Orientation - Normal. Memory - Normal.   Psychiatric Normal Orientation - Oriented to time, place, person & situation. No agitation. Not anxious. Appropriate mood and affect.          Medications (added, continued, or stopped this visit):  Start Date Medication Directions PRN Status PRN Reason Instruction Stop Date   12/21/2015 amlodipine 10 mg tablet take 1 tablet by oral route  every day N      12/21/2015 hydrochlorothiazide 25 mg tablet take 1 tablet by oral route  every day N      12/21/2015 pravastatin 40 mg tablet take 1 tablet by oral route  every day N      12/21/2015 sertraline 100 mg tablet take 1 tablet by oral route  every day N      12/21/2015 Vicodin HP 10 mg-300 mg tablet take 1 tablet by oral route  every 6 hours as needed N      To Be Scheduled / Ordered:  Status Order Reason Assessment Timeframe Appointment   ordered ELECTROCARDIOGRAM, COMPLETE       ordered Basic Metabolic Panel (8) AU  L08.85     ordered Hematocrit  D49.89     ordered Hemoglobin  D49.89       Lab Studies:  Status Lab Study Schedule Timeframe Schedule Date Comments Interpretation Value   ordered Basic Metabolic Panel (8) AU        ordered Hematocrit        ordered Hemoglobin              Active Patient Care Team Members    Name Contact Agency Type Support Role Relationship Active Date Inactive Date [facility]   Patient provider PCP          Provider:   Koby Machado 07/02/2018 12:12 PM   Document generated by:  Nixon Viveros 07/02/2018 12:12 PM                           Electronically signed by Nixon Viveros MD on 07/02/2018 07:25 PM

## 2018-07-18 ENCOUNTER — ANESTHESIA EVENT (OUTPATIENT)
Dept: SURGERY | Age: 73
End: 2018-07-18
Payer: MEDICARE

## 2018-07-18 RX ORDER — NALOXONE HYDROCHLORIDE 0.4 MG/ML
0.1 INJECTION, SOLUTION INTRAMUSCULAR; INTRAVENOUS; SUBCUTANEOUS AS NEEDED
Status: CANCELLED | OUTPATIENT
Start: 2018-07-18

## 2018-07-18 RX ORDER — MAGNESIUM SULFATE 100 %
4 CRYSTALS MISCELLANEOUS AS NEEDED
Status: CANCELLED | OUTPATIENT
Start: 2018-07-18

## 2018-07-18 RX ORDER — SODIUM CHLORIDE, SODIUM LACTATE, POTASSIUM CHLORIDE, CALCIUM CHLORIDE 600; 310; 30; 20 MG/100ML; MG/100ML; MG/100ML; MG/100ML
1000 INJECTION, SOLUTION INTRAVENOUS CONTINUOUS
Status: CANCELLED | OUTPATIENT
Start: 2018-07-18

## 2018-07-18 RX ORDER — DEXTROSE 50 % IN WATER (D50W) INTRAVENOUS SYRINGE
25-50 AS NEEDED
Status: CANCELLED | OUTPATIENT
Start: 2018-07-18

## 2018-07-18 RX ORDER — FLUMAZENIL 0.1 MG/ML
0.2 INJECTION INTRAVENOUS
Status: CANCELLED | OUTPATIENT
Start: 2018-07-18

## 2018-07-18 RX ORDER — INSULIN LISPRO 100 [IU]/ML
INJECTION, SOLUTION INTRAVENOUS; SUBCUTANEOUS ONCE
Status: CANCELLED | OUTPATIENT
Start: 2018-07-18 | End: 2018-07-19

## 2018-07-18 RX ORDER — FENTANYL CITRATE 50 UG/ML
50 INJECTION, SOLUTION INTRAMUSCULAR; INTRAVENOUS
Status: CANCELLED | OUTPATIENT
Start: 2018-07-18

## 2018-07-18 RX ORDER — SODIUM CHLORIDE 0.9 % (FLUSH) 0.9 %
5-10 SYRINGE (ML) INJECTION AS NEEDED
Status: CANCELLED | OUTPATIENT
Start: 2018-07-18

## 2018-07-19 ENCOUNTER — HOSPITAL ENCOUNTER (OUTPATIENT)
Age: 73
Setting detail: OUTPATIENT SURGERY
Discharge: HOME | End: 2018-07-19
Attending: SURGERY | Admitting: SURGERY
Payer: MEDICARE

## 2018-07-19 ENCOUNTER — HOSPITAL ENCOUNTER (OUTPATIENT)
Dept: ULTRASOUND IMAGING | Age: 73
Discharge: HOME | End: 2018-07-19
Attending: SURGERY
Payer: MEDICARE

## 2018-07-19 ENCOUNTER — ANESTHESIA (OUTPATIENT)
Dept: SURGERY | Age: 73
End: 2018-07-19
Payer: MEDICARE

## 2018-07-19 VITALS
OXYGEN SATURATION: 100 % | TEMPERATURE: 97.2 F | DIASTOLIC BLOOD PRESSURE: 85 MMHG | BODY MASS INDEX: 22.32 KG/M2 | WEIGHT: 126 LBS | HEIGHT: 63 IN | RESPIRATION RATE: 16 BRPM | SYSTOLIC BLOOD PRESSURE: 154 MMHG | HEART RATE: 72 BPM

## 2018-07-19 DIAGNOSIS — R22.2 CHEST WALL MASS: ICD-10-CM

## 2018-07-19 DIAGNOSIS — C43.9 MALIGNANT MELANOMA, UNSPECIFIED SITE (HCC): Primary | ICD-10-CM

## 2018-07-19 LAB — POTASSIUM SERPL-SCNC: 3.9 MMOL/L (ref 3.5–5.5)

## 2018-07-19 PROCEDURE — 74011000250 HC RX REV CODE- 250: Performed by: SURGERY

## 2018-07-19 PROCEDURE — 76060000032 HC ANESTHESIA 0.5 TO 1 HR: Performed by: SURGERY

## 2018-07-19 PROCEDURE — 76210000021 HC REC RM PH II 0.5 TO 1 HR: Performed by: SURGERY

## 2018-07-19 PROCEDURE — 74011000250 HC RX REV CODE- 250

## 2018-07-19 PROCEDURE — 84132 ASSAY OF SERUM POTASSIUM: CPT | Performed by: ANESTHESIOLOGY

## 2018-07-19 PROCEDURE — 77030020782 HC GWN BAIR PAWS FLX 3M -B: Performed by: SURGERY

## 2018-07-19 PROCEDURE — 76010000138 HC OR TIME 0.5 TO 1 HR: Performed by: SURGERY

## 2018-07-19 PROCEDURE — 77030002916 HC SUT ETHLN J&J -A: Performed by: SURGERY

## 2018-07-19 PROCEDURE — 74011250636 HC RX REV CODE- 250/636: Performed by: SURGERY

## 2018-07-19 PROCEDURE — 74011250636 HC RX REV CODE- 250/636

## 2018-07-19 PROCEDURE — 77030011267 HC ELECTRD BLD COVD -A: Performed by: SURGERY

## 2018-07-19 PROCEDURE — 77030013687 US GUIDE NDL PLC

## 2018-07-19 PROCEDURE — 77030012407 HC DRN WND BARD -B: Performed by: SURGERY

## 2018-07-19 PROCEDURE — 77030002933 HC SUT MCRYL J&J -A: Performed by: SURGERY

## 2018-07-19 PROCEDURE — 77030013567 HC DRN WND RESERV BARD -A: Performed by: SURGERY

## 2018-07-19 PROCEDURE — 88307 TISSUE EXAM BY PATHOLOGIST: CPT | Performed by: SURGERY

## 2018-07-19 PROCEDURE — 36415 COLL VENOUS BLD VENIPUNCTURE: CPT | Performed by: ANESTHESIOLOGY

## 2018-07-19 RX ORDER — PROPOFOL 10 MG/ML
INJECTION, EMULSION INTRAVENOUS AS NEEDED
Status: DISCONTINUED | OUTPATIENT
Start: 2018-07-19 | End: 2018-07-19 | Stop reason: HOSPADM

## 2018-07-19 RX ORDER — PROPOFOL 10 MG/ML
INJECTION, EMULSION INTRAVENOUS
Status: DISCONTINUED | OUTPATIENT
Start: 2018-07-19 | End: 2018-07-19 | Stop reason: HOSPADM

## 2018-07-19 RX ORDER — OXYCODONE AND ACETAMINOPHEN 5; 325 MG/1; MG/1
1 TABLET ORAL
Qty: 30 TAB | Refills: 0 | Status: SHIPPED | OUTPATIENT
Start: 2018-07-19 | End: 2022-10-06 | Stop reason: CLARIF

## 2018-07-19 RX ORDER — FENTANYL CITRATE 50 UG/ML
INJECTION, SOLUTION INTRAMUSCULAR; INTRAVENOUS AS NEEDED
Status: DISCONTINUED | OUTPATIENT
Start: 2018-07-19 | End: 2018-07-19 | Stop reason: HOSPADM

## 2018-07-19 RX ORDER — ONDANSETRON 2 MG/ML
INJECTION INTRAMUSCULAR; INTRAVENOUS AS NEEDED
Status: DISCONTINUED | OUTPATIENT
Start: 2018-07-19 | End: 2018-07-19 | Stop reason: HOSPADM

## 2018-07-19 RX ORDER — LIDOCAINE HYDROCHLORIDE 10 MG/ML
10 INJECTION, SOLUTION EPIDURAL; INFILTRATION; INTRACAUDAL; PERINEURAL
Status: COMPLETED | OUTPATIENT
Start: 2018-07-19 | End: 2018-07-19

## 2018-07-19 RX ORDER — SODIUM CHLORIDE, SODIUM LACTATE, POTASSIUM CHLORIDE, CALCIUM CHLORIDE 600; 310; 30; 20 MG/100ML; MG/100ML; MG/100ML; MG/100ML
125 INJECTION, SOLUTION INTRAVENOUS CONTINUOUS
Status: DISCONTINUED | OUTPATIENT
Start: 2018-07-19 | End: 2018-07-19 | Stop reason: HOSPADM

## 2018-07-19 RX ORDER — BUPIVACAINE HYDROCHLORIDE 5 MG/ML
INJECTION, SOLUTION EPIDURAL; INTRACAUDAL AS NEEDED
Status: DISCONTINUED | OUTPATIENT
Start: 2018-07-19 | End: 2018-07-19 | Stop reason: HOSPADM

## 2018-07-19 RX ORDER — MIDAZOLAM HYDROCHLORIDE 1 MG/ML
INJECTION, SOLUTION INTRAMUSCULAR; INTRAVENOUS AS NEEDED
Status: DISCONTINUED | OUTPATIENT
Start: 2018-07-19 | End: 2018-07-19 | Stop reason: HOSPADM

## 2018-07-19 RX ORDER — LIDOCAINE HYDROCHLORIDE 20 MG/ML
INJECTION, SOLUTION EPIDURAL; INFILTRATION; INTRACAUDAL; PERINEURAL AS NEEDED
Status: DISCONTINUED | OUTPATIENT
Start: 2018-07-19 | End: 2018-07-19 | Stop reason: HOSPADM

## 2018-07-19 RX ADMIN — SODIUM CHLORIDE, SODIUM LACTATE, POTASSIUM CHLORIDE, AND CALCIUM CHLORIDE: 600; 310; 30; 20 INJECTION, SOLUTION INTRAVENOUS at 11:52

## 2018-07-19 RX ADMIN — PROPOFOL 20 MG: 10 INJECTION, EMULSION INTRAVENOUS at 11:22

## 2018-07-19 RX ADMIN — SODIUM CHLORIDE, SODIUM LACTATE, POTASSIUM CHLORIDE, AND CALCIUM CHLORIDE 125 ML/HR: 600; 310; 30; 20 INJECTION, SOLUTION INTRAVENOUS at 08:32

## 2018-07-19 RX ADMIN — ONDANSETRON 4 MG: 2 INJECTION INTRAMUSCULAR; INTRAVENOUS at 11:27

## 2018-07-19 RX ADMIN — PROPOFOL 20 MG: 10 INJECTION, EMULSION INTRAVENOUS at 11:24

## 2018-07-19 RX ADMIN — FENTANYL CITRATE 25 MCG: 50 INJECTION, SOLUTION INTRAMUSCULAR; INTRAVENOUS at 11:47

## 2018-07-19 RX ADMIN — LIDOCAINE HYDROCHLORIDE 40 MG: 20 INJECTION, SOLUTION EPIDURAL; INFILTRATION; INTRACAUDAL; PERINEURAL at 11:22

## 2018-07-19 RX ADMIN — FENTANYL CITRATE 25 MCG: 50 INJECTION, SOLUTION INTRAMUSCULAR; INTRAVENOUS at 11:29

## 2018-07-19 RX ADMIN — PROPOFOL 50 MCG/KG/MIN: 10 INJECTION, EMULSION INTRAVENOUS at 11:26

## 2018-07-19 RX ADMIN — MIDAZOLAM HYDROCHLORIDE 1 MG: 1 INJECTION, SOLUTION INTRAMUSCULAR; INTRAVENOUS at 11:16

## 2018-07-19 RX ADMIN — LIDOCAINE HYDROCHLORIDE 5 ML: 10 INJECTION, SOLUTION EPIDURAL; INFILTRATION; INTRACAUDAL; PERINEURAL at 09:10

## 2018-07-19 NOTE — DISCHARGE INSTRUCTIONS
Rest elevated  Resume pre-hospital diet  Drink plenty of fluids  Take prescription as directed  Ambulate multiple times daily  Change surgical dressing in 2 days  Keep OLIVIA drain emptied - record on OLIVIA drain sheet - take to first follow up appointment with Dr. Bear Mims  Follow up with Dr. Bear Mims in 1 week  Contact Dr. Soni Baum office with any Post op questions or concerns at [address] [place] [address] from Nurse    PATIENT INSTRUCTIONS:    After general anesthesia or intravenous sedation, for 24 hours or while taking prescription Narcotics:  · Limit your activities  · Do not drive and operate hazardous machinery  · Do not make important personal or business decisions  · Do  not drink alcoholic beverages  · If you have not urinated within 8 hours after discharge, please contact your surgeon on call. Report the following to your surgeon:  · Excessive pain, swelling, redness or odor of or around the surgical area  · Temperature over 100.5  · Nausea and vomiting lasting longer than 4 hours or if unable to take medications  · Any signs of decreased circulation or nerve impairment to extremity: change in color, persistent  numbness, tingling, coldness or increase pain  · Any questions    What to do at Home:  Recommended activity: Ambulate in house, no heavy lifting    If you experience any of the following symptoms fever, chills, uncontrollable pain, active bleeding or drainage from surgical site, nausea, vomiting, please follow up with Dr. Bear Mims. *  Please give a list of your current medications to your Primary Care Provider. *  Please update this list whenever your medications are discontinued, doses are      changed, or new medications (including over-the-counter products) are added. *  Please carry medication information at all times in case of emergency situations.     These are general instructions for a healthy lifestyle:    No smoking/ No tobacco products/ Avoid exposure to second hand smoke  Surgeon General's Warning:  Quitting smoking now greatly reduces serious risk to your health. Obesity, smoking, and sedentary lifestyle greatly increases your risk for illness    A healthy diet, regular physical exercise & weight monitoring are important for maintaining a healthy lifestyle    You may be retaining fluid if you have a history of heart failure or if you experience any of the following symptoms:  Weight gain of 3 pounds or more overnight or 5 pounds in a week, increased swelling in our hands or feet or shortness of breath while lying flat in bed. Please call your doctor as soon as you notice any of these symptoms; do not wait until your next office visit. Recognize signs and symptoms of STROKE:    F-face looks uneven    A-arms unable to move or move unevenly    S-speech slurred or non-existent    T-time-call 911 as soon as signs and symptoms begin-DO NOT go       Back to bed or wait to see if you get better-TIME IS BRAIN. Warning Signs of HEART ATTACK     Call 911 if you have these symptoms:   Chest discomfort. Most heart attacks involve discomfort in the center of the chest that lasts more than a few minutes, or that goes away and comes back. It can feel like uncomfortable pressure, squeezing, fullness, or pain.  Discomfort in other areas of the upper body. Symptoms can include pain or discomfort in one or both arms, the back, neck, jaw, or stomach.  Shortness of breath with or without chest discomfort.  Other signs may include breaking out in a cold sweat, nausea, or lightheadedness. Don't wait more than five minutes to call 911 - MINUTES MATTER! Fast action can save your life. Calling 911 is almost always the fastest way to get lifesaving treatment. Emergency Medical Services staff can begin treatment when they arrive -- up to an hour sooner than if someone gets to the hospital by car.      Patient armband removed and shredded    The discharge information has been reviewed with the patient and caregiver. The patient and caregiver verbalized understanding. Discharge medications reviewed with the patient and caregiver and appropriate educational materials and side effects teaching were provided.   ___________________________________________________________________________________________________________________________________

## 2018-07-19 NOTE — ANESTHESIA PREPROCEDURE EVALUATION
Anesthetic History     PONV          Review of Systems / Medical History  Patient summary reviewed, nursing notes reviewed and pertinent labs reviewed    Pulmonary    COPD: mild      Smoker         Neuro/Psych   Within defined limits           Cardiovascular    Hypertension: poorly controlled              Exercise tolerance: >4 METS     GI/Hepatic/Renal     GERD: well controlled           Endo/Other  Within defined limits           Other Findings            Physical Exam    Airway  Mallampati: III  TM Distance: 4 - 6 cm  Neck ROM: normal range of motion   Mouth opening: Normal     Cardiovascular    Rhythm: regular  Rate: normal         Dental    Dentition: Edentulous     Pulmonary  Breath sounds clear to auscultation               Abdominal  GI exam deferred       Other Findings            Anesthetic Plan    ASA: 3  Anesthesia type: MAC          Induction: Intravenous  Anesthetic plan and risks discussed with: Patient

## 2018-07-19 NOTE — OP NOTES
[facility] [place]  OPERATIVE REPORT    Domo Booth  MR#: [id number]  : 1945  ACCOUNT #: [de-identified]   DATE OF SERVICE: 2018    PREOPERATIVE DIAGNOSIS:  Metastatic melanoma. POSTOPERATIVE DIAGNOSIS:  Metastatic melanoma. PROCEDURE PERFORMED:  Right axillary lymph node dissection. SURGEON:  Nabila Waters MD    ANESTHESIA:  General and local.    INDICATION FOR PROCEDURE:  This is a 76-year-old female who had a melanoma excised from the soft tissue of her right buttock. She has since developed an abnormal PET scan which lit up in the right axillary area. She is brought to the operating room after localization of the lymph node with wire and she is brought to the operating room for lymph node removal.    DESCRIPTION OF THE PROCEDURE:  The patient was brought to the operating room, placed on the table in the supine position. After placement of monitors and adequate general anesthesia, the right axilla was prepped and draped in the usual sterile fashion. A curved incision was made through the skin down to subcutaneous tissue at the wire site. The fatty tissue was divided and dissection was carried down to the axillary fascia, which was divided. The wire went into a lymph node, which was in the upper axilla. It was matted to other nodes; therefore a standard lymph node dissection was performed. Dissection was carried up to the right axillary vein. The dissection was carried posteriorly to the thoracodorsal neurovascular complex. These nodes were all sent to pathology in formalin. There was good hemostasis with the electrocautery. A 15-Latvian fluted drain was placed through a separate stab incision and secured to the skin with a nylon suture. Marcaine was injected locally. Subcutaneous tissue was reapproximated with interrupted 3-0 Monocryl suture and 4-0 Monocryl was used to reapproximate the skin. Steri-Strips were applied and the wound was dressed sterilely.   The sponge, instrument and needle count was correct at the end of the procedure. ESTIMATED BLOOD LOSS:  3 mL. SPECIMENS REMOVED:  Right axillary lymph nodes. DRAINS:  Guillermo drain 15-Irish. IMPLANTS:  No other inserts. COMPLICATIONS:  No complications. FIRST ASSISTANT:  MD Dave Means / Hema Saunders  D: 07/19/2018 15:19     T: 07/19/2018 16:01  JOB #: [id number]

## 2018-07-19 NOTE — ANESTHESIA POSTPROCEDURE EVALUATION
Post-Anesthesia Evaluation & Assessment    Visit Vitals    /85    Pulse 72    Temp 36.2 °C (97.2 °F)    Resp 16    Ht 5' 3\" (1.6 m)    Wt 57.2 kg (126 lb)    SpO2 100%    BMI 22.32 kg/m2       No untreated/active PONV    Post-operative hydration adequate. Adequate post-operative analgesia per PACU discharge criteria    Mental status & level of consciousness: alert and oriented x 3    Respiratory status: patent unassisted airway     No apparent anesthetic complications requiring additional post-anesthetic care    Patient has met all discharge requirements.             Verena Kemp MD

## 2018-07-19 NOTE — ROUTINE PROCESS
Right lymph node wire placement done by Dr Alvino Gottlieb. Patient tolerated procedure well. Patient left department is stable condition, being transported to Pre-op holding.

## 2018-07-19 NOTE — IP AVS SNAPSHOT
[address] [address] 
[phone] Patient: Yasemin Travis MRN: [id number] ZIX:[id number] About your hospitalization You were admitted on:  July 19, 2018 You last received care in the:  [address] [place] [address] You were discharged on:  July 19, 2018 Why you were hospitalized Your primary diagnosis was:  Not on File Follow-up Information Follow up With Details Comments Contact Info Beth Wood MD   [address] [place] [address] [address] 
[phone] Shalini Webb MD Follow up in 1 week(s)  [facility] [address] [facility] [address] 
[phone] Discharge Orders None A check makeda indicates which time of day the medication should be taken. My Medications START taking these medications Instructions Each Dose to Equal  
 Morning Noon Evening Bedtime  
 oxyCODONE-acetaminophen 5-325 mg per tablet Commonly known as:  PERCOCET Your last dose was: Your next dose is: Take 1 Tab by mouth every six (6) hours as needed for Pain. Max Daily Amount: 4 Tabs. 1 Tab CONTINUE taking these medications Instructions Each Dose to Equal  
 Morning Noon Evening Bedtime  
 amLODIPine 10 mg tablet Commonly known as:  Indiana Bowden Your last dose was: Your next dose is: Take 10 mg by mouth daily. Indications: HYPERTENSION 10 mg  
    
   
   
   
  
 aspirin delayed-release 81 mg tablet Your last dose was: Your next dose is: Take 81 mg by mouth daily. 81 mg  
    
   
   
   
  
 atorvastatin 20 mg tablet Commonly known as:  LIPITOR Your last dose was: Your next dose is: Take 20 mg by mouth daily. 20 mg  
    
   
   
   
  
 * gabapentin 300 mg capsule Commonly known as:  NEURONTIN Your last dose was: Your next dose is: Take 300 mg by mouth two (2) times a day. 300 mg  
    
   
   
   
  
 * gabapentin 300 mg capsule Commonly known as:  NEURONTIN Your last dose was: Your next dose is: Take 900 mg by mouth nightly. 900 mg HYDROcodone-acetaminophen  mg tablet Commonly known as:  Mo Ibanez Your last dose was: Your next dose is: Take 1 Tab by mouth every six (6) hours as needed. Max Daily Amount: 4 Tabs. 1 Tab  
    
   
   
   
  
 naloxone 4 mg/actuation nasal spray Commonly known as:  [employer] Your last dose was: Your next dose is:    
   
   
 Use 1 spray intranasally into 1 nostril. Use a new Narcan nasal spray for subsequent doses and administer into alternating nostrils. May repeat every 2 to 3 minutes as needed. PLAVIX 75 mg Tab Generic drug:  clopidogrel Your last dose was: Your next dose is: Take 75 mg by mouth daily. 75 mg  
    
   
   
   
  
 sertraline 100 mg tablet Commonly known as:  ZOLOFT Your last dose was: Your next dose is: Take 100 mg by mouth daily. Indications: ANXIETY WITH DEPRESSION  
 100 mg  
    
   
   
   
  
 temazepam 30 mg capsule Commonly known as:  RESTORIL Your last dose was: Your next dose is: Take 30 mg by mouth nightly. 30 mg  
    
   
   
   
  
 * Notice: This list has 2 medication(s) that are the same as other medications prescribed for you. Read the directions carefully, and ask your doctor or other care provider to review them with you. Where to Get Your Medications Information on where to get these meds will be given to you by the nurse or doctor. ! Ask your nurse or doctor about these medications  
  oxyCODONE-acetaminophen 5-325 mg per tablet Opioid Education Prescription Opioids: What You Need to Know: 
 
 
After general anesthesia or intravenous sedation, for 24 hours or while taking prescription Narcotics: · Limit your activities · Do not drive and operate hazardous machinery · Do not make important personal or business decisions · Do  not drink alcoholic beverages · If you have not urinated within 8 hours after discharge, please contact your surgeon on call. Report the following to your surgeon: 
· Excessive pain, swelling, redness or odor of or around the surgical area · Temperature over 100.5 · Nausea and vomiting lasting longer than 4 hours or if unable to take medications · Any signs of decreased circulation or nerve impairment to extremity: change in color, persistent  numbness, tingling, coldness or increase pain · Any questions What to do at Home: 
Recommended activity: Ambulate in house, no heavy lifting If you experience any of the following symptoms fever, chills, uncontrollable pain, active bleeding or drainage from surgical site, nausea, vomiting, please follow up with Dr. Eloy Mack. *  Please give a list of your current medications to your Primary Care Provider.  
 
*  Please update this list whenever your medications are discontinued, doses are 
 changed, or new medications (including over-the-counter products) are added. *  Please carry medication information at all times in case of emergency situations. These are general instructions for a healthy lifestyle: No smoking/ No tobacco products/ Avoid exposure to second hand smoke Surgeon General's Warning:  Quitting smoking now greatly reduces serious risk to your health. Obesity, smoking, and sedentary lifestyle greatly increases your risk for illness A healthy diet, regular physical exercise & weight monitoring are important for maintaining a healthy lifestyle You may be retaining fluid if you have a history of heart failure or if you experience any of the following symptoms:  Weight gain of 3 pounds or more overnight or 5 pounds in a week, increased swelling in our hands or feet or shortness of breath while lying flat in bed. Please call your doctor as soon as you notice any of these symptoms; do not wait until your next office visit. Recognize signs and symptoms of STROKE: 
 
F-face looks uneven A-arms unable to move or move unevenly S-speech slurred or non-existent T-time-call 911 as soon as signs and symptoms begin-DO NOT go Back to bed or wait to see if you get better-TIME IS BRAIN. Warning Signs of HEART ATTACK Call 911 if you have these symptoms: 
? Chest discomfort. Most heart attacks involve discomfort in the center of the chest that lasts more than a few minutes, or that goes away and comes back. It can feel like uncomfortable pressure, squeezing, fullness, or pain. ? Discomfort in other areas of the upper body. Symptoms can include pain or discomfort in one or both arms, the back, neck, jaw, or stomach. ? Shortness of breath with or without chest discomfort. ? Other signs may include breaking out in a cold sweat, nausea, or lightheadedness. Don't wait more than five minutes to call [address]!  Fast action can save your life. Calling 911 is almost always the fastest way to get lifesaving treatment. Emergency Medical Services staff can begin treatment when they arrive  up to an hour sooner than if someone gets to the hospital by car. Patient armband removed and shredded The discharge information has been reviewed with the patient and caregiver. The patient and caregiver verbalized understanding. Discharge medications reviewed with the patient and caregiver and appropriate educational materials and side effects teaching were provided. ___________________________________________________________________________________________________________________________________ Introducing [facility]! [facility] introduces [employer] patient portal. Now you can access parts of your medical record, email your doctor's office, and request medication refills online. 1. In your internet browser, go to [email or web address] [email or web address] 2. Click on the First Time User? Click Here link in the Sign In box. You will see the New Member Sign Up page. 3. Enter your [employer] Access Code exactly as it appears below. You will not need to use this code after youve completed the sign-up process. If you do not sign up before the expiration date, you must request a new code. · [employer] Access Code: [email or web address] Expires: 8/8/2018 12:15 PM 
 
4. Enter the last four digits of your Social Security Number (xxxx) and Date of Birth (mm/dd/yyyy) as indicated and click Submit. You will be taken to the next sign-up page. 5. Create a CheckiOt ID. This will be your CheckiOt login ID and cannot be changed, so think of one that is secure and easy to remember. 6. Create a CheckiOt password. You can change your password at any time. 7. Enter your Password Reset Question and Answer. This can be used at a later time if you forget your password. 8. Enter your e-mail address.  You will receive e-mail notification when new information is available in [employer]. 9. Click Sign Up. You can now view and download portions of your medical record. 10. Click the Download Summary menu link to download a portable copy of your medical information. If you have questions, please visit the Frequently Asked Questions section of the [employer] website. Remember, [employer] is NOT to be used for urgent needs. For medical emergencies, dial 911. Now available from your iPhone and Android! Introducing Bam Boyd As a [facility] patient, I wanted to make you aware of our electronic visit tool called Bam Boyd. [facility] 24/7 allows you to connect within minutes with a medical provider 24 hours a day, seven days a week via a mobile device or tablet or logging into a secure website from your computer. You can access Bam Boyd from anywhere in the [place]. A virtual visit might be right for you when you have a simple condition and feel like you just dont want to get out of bed, or cant get away from work for an appointment, when your regular [facility] provider is not available (evenings, weekends or holidays), or when youre out of town and need minor care. Electronic visits cost only $49 and if the [facility] 24/[facility] provider determines a prescription is needed to treat your condition, one can be electronically transmitted to a nearby pharmacy*. Please take a moment to enroll today if you have not already done so. The enrollment process is free and takes just a few minutes. To enroll, please download the [facility] 24/[facility] sami to your tablet or phone, or visit [email or web address] to enroll on your computer. And, as an [address] [place] [address] patient with a [employer] account, the results of your visits will be scanned into your electronic medical record and your primary care provider will be able to view the scanned results. We urge you to continue to see your regular [facility] provider for your ongoing medical care. And while your primary care provider may not be the one available when you seek a [employer]st[employer]itluif[employer] virtual visit, the peace of mind you get from getting a real diagnosis real time can be priceless. For more information on [employer]st[employer]itluifin, view our Frequently Asked Questions (FAQs) at [email or web address]. Sincerely, 
 
Dominga Stringer MD 
Chief Medical Officer [address] *:  certain medications cannot be prescribed via [employer]st[employer]itluifin Providers Seen During Your Hospitalization Provider Specialty Primary office phone Hazel De La Torre MD General Surgery [phone] Your Primary Care Physician (PCP) Primary Care Physician Office Phone Office Fax Stephen Heard [phone] You are allergic to the following Allergen Reactions Aleve (Naproxen Sodium) Shortness of Breath Rash Iodinated Contrast- Oral And Iv Dye Rash Cough Triamterene-Hydrochlorothiazid Itching Recent Documentation Height Weight BMI OB Status Smoking Status 1.6 m 57.2 kg 22.32 kg/m2 Hysterectomy Current Every Day Smoker Emergency Contacts Name Discharge Info Relation Home Work [place] CAREGIVER [3] Daughter [21]   [phone] Patient Belongings The following personal items are in your possession at time of discharge: 
  Dental Appliances: None  Visual Aid: Glasses, Sent home      Home Medications: None   Jewelry: None  Clothing: Pants, Undergarments, Footwear, Sent home, Shirt (to daughter Vinie Friday)    Other Valuables: Eyeglasses, Sent home Santana Epley) Please provide this summary of care documentation to your next provider. Signatures-by signing, you are acknowledging that this After Visit Summary has been reviewed with you and you have received a copy. Patient Signature:  ____________________________________________________________ Date:  ____________________________________________________________  
  
Nelida Diana Provider Signature:  ____________________________________________________________ Date:  ____________________________________________________________

## 2018-07-19 NOTE — PERIOP NOTES
Discharge instructions completed - opportunity for questions - instructed pt and daughter on OLIVIA drain care - pt requesting discharge - will page Dr. Jason Saavedra again

## 2018-07-19 NOTE — PERIOP NOTES
Made Dr. Tracy Shen aware pt current blood pressure (see VS) and stated \"lets give it a few minutes\" . No new order receive at this time.

## 2018-07-19 NOTE — INTERVAL H&P NOTE
H&P Update:  Malissa Ferris was seen and examined. History and physical has been reviewed. The patient has been examined. There have been no significant clinical changes since the completion of the originally dated History and Physical.  Patient identified by surgeon; surgical site was confirmed by patient and surgeon.     Signed By: Jatin Robb MD     July 19, 2018 10:00 AM

## 2018-08-03 ENCOUNTER — HOSPITAL ENCOUNTER (OUTPATIENT)
Dept: LAB | Age: 73
Discharge: HOME | End: 2018-08-03
Payer: MEDICARE

## 2018-08-03 PROCEDURE — 88304 TISSUE EXAM BY PATHOLOGIST: CPT | Performed by: SURGERY

## 2020-03-10 NOTE — PERIOP NOTES
Pre-Injection Information: Vital signs entered., Allergies reviewed as required for injection type., Pt states feeling well, no complaints. and Pt denies signs and symptoms of infection.    Vitamin B12 1000mcg  Refer to MAR (medication administration record) for type of injection and medication given.  Needle Size: 25 g. 5/8\"  Patient tolerated well: Stable and Follow up appointment scheduled    Future Appointments   Date Time Provider Department Center   3/18/2020 12:00 PM Jamir Uriostegui MD [facility]   3/19/2020 11:00 AM GABRIEL [facility]   5/6/2020 11:00 AM [facility] TEAM ROOM 2 [address]   7/8/2020 10:45 AM [facility]   7/8/2020 11:00 AM Daly Flores MD [address]      Dr. Ibrahim is supervising clinician today.   Reviewed PTA medication list with patient/caregiver and patient/caregiver denies any additional medications.

## 2022-10-06 ENCOUNTER — HOSPITAL ENCOUNTER (OUTPATIENT)
Dept: PREADMISSION TESTING | Age: 77
Discharge: HOME | End: 2022-10-06

## 2022-10-06 ENCOUNTER — TRANSCRIBE ORDER (OUTPATIENT)
Dept: REGISTRATION | Age: 77
End: 2022-10-06

## 2022-10-06 ENCOUNTER — HOSPITAL ENCOUNTER (OUTPATIENT)
Dept: PREADMISSION TESTING | Age: 77
Discharge: HOME | End: 2022-10-06
Payer: MEDICARE

## 2022-10-06 VITALS — WEIGHT: 105 LBS | BODY MASS INDEX: 18.61 KG/M2 | HEIGHT: 63 IN

## 2022-10-06 DIAGNOSIS — G89.4 CHRONIC PAIN SYNDROME: Primary | ICD-10-CM

## 2022-10-06 DIAGNOSIS — G89.4 CHRONIC PAIN SYNDROME: ICD-10-CM

## 2022-10-06 LAB
ANION GAP SERPL CALC-SCNC: 7 MMOL/L (ref 3–18)
ATRIAL RATE: 79 BPM
BASOPHILS # BLD: 0.1 K/UL (ref 0–0.1)
BASOPHILS NFR BLD: 1 % (ref 0–2)
BUN SERPL-MCNC: 16 MG/DL (ref 7–18)
BUN/CREAT SERPL: 15 (ref 12–20)
CALCIUM SERPL-MCNC: 9.1 MG/DL (ref 8.5–10.1)
CALCULATED P AXIS, ECG09: 81 DEGREES
CALCULATED R AXIS, ECG10: 82 DEGREES
CALCULATED T AXIS, ECG11: 82 DEGREES
CHLORIDE SERPL-SCNC: 108 MMOL/L (ref 100–111)
CO2 SERPL-SCNC: 25 MMOL/L (ref 21–32)
CREAT SERPL-MCNC: 1.08 MG/DL (ref 0.6–1.3)
DIAGNOSIS, 93000: NORMAL
DIFFERENTIAL METHOD BLD: ABNORMAL
EOSINOPHIL # BLD: 0.2 K/UL (ref 0–0.4)
EOSINOPHIL NFR BLD: 3 % (ref 0–5)
ERYTHROCYTE [DISTWIDTH] IN BLOOD BY AUTOMATED COUNT: 12.4 % (ref 11.6–14.5)
GLUCOSE SERPL-MCNC: 113 MG/DL (ref 74–99)
HCT VFR BLD AUTO: 38.8 % (ref 35–45)
HGB BLD-MCNC: 13 G/DL (ref 12–16)
IMM GRANULOCYTES # BLD AUTO: 0 K/UL (ref 0–0.04)
IMM GRANULOCYTES NFR BLD AUTO: 0 % (ref 0–0.5)
LYMPHOCYTES # BLD: 0.7 K/UL (ref 0.9–3.6)
LYMPHOCYTES NFR BLD: 12 % (ref 21–52)
MCH RBC QN AUTO: 32.1 PG (ref 24–34)
MCHC RBC AUTO-ENTMCNC: 33.5 G/DL (ref 31–37)
MCV RBC AUTO: 95.8 FL (ref 78–100)
MONOCYTES # BLD: 0.5 K/UL (ref 0.05–1.2)
MONOCYTES NFR BLD: 9 % (ref 3–10)
NEUTS SEG # BLD: 4.5 K/UL (ref 1.8–8)
NEUTS SEG NFR BLD: 75 % (ref 40–73)
NRBC # BLD: 0 K/UL (ref 0–0.01)
NRBC BLD-RTO: 0 PER 100 WBC
P-R INTERVAL, ECG05: 150 MS
PLATELET # BLD AUTO: 316 K/UL (ref 135–420)
PMV BLD AUTO: 9.4 FL (ref 9.2–11.8)
POTASSIUM SERPL-SCNC: 3.5 MMOL/L (ref 3.5–5.5)
Q-T INTERVAL, ECG07: 398 MS
QRS DURATION, ECG06: 88 MS
QTC CALCULATION (BEZET), ECG08: 456 MS
RBC # BLD AUTO: 4.05 M/UL (ref 4.2–5.3)
SODIUM SERPL-SCNC: 140 MMOL/L (ref 136–145)
VENTRICULAR RATE, ECG03: 79 BPM
WBC # BLD AUTO: 6 K/UL (ref 4.6–13.2)

## 2022-10-06 PROCEDURE — 85025 COMPLETE CBC W/AUTO DIFF WBC: CPT

## 2022-10-06 PROCEDURE — 36415 COLL VENOUS BLD VENIPUNCTURE: CPT

## 2022-10-06 PROCEDURE — 93005 ELECTROCARDIOGRAM TRACING: CPT

## 2022-10-06 PROCEDURE — 80048 BASIC METABOLIC PNL TOTAL CA: CPT

## 2022-10-06 RX ORDER — ALBUTEROL SULFATE 90 UG/1
AEROSOL, METERED RESPIRATORY (INHALATION) AS NEEDED
COMMUNITY
Start: 2022-02-17

## 2022-10-06 RX ORDER — FERROUS SULFATE 324(65)MG
TABLET, DELAYED RELEASE (ENTERIC COATED) ORAL
COMMUNITY

## 2022-10-06 RX ORDER — SODIUM CHLORIDE, SODIUM LACTATE, POTASSIUM CHLORIDE, CALCIUM CHLORIDE 600; 310; 30; 20 MG/100ML; MG/100ML; MG/100ML; MG/100ML
125 INJECTION, SOLUTION INTRAVENOUS CONTINUOUS
Status: CANCELLED | OUTPATIENT
Start: 2022-10-12

## 2022-10-06 RX ORDER — LISINOPRIL 40 MG/1
40 TABLET ORAL DAILY
COMMUNITY

## 2022-10-06 RX ORDER — CEFAZOLIN SODIUM/WATER 2 G/20 ML
2 SYRINGE (ML) INTRAVENOUS
Status: CANCELLED | OUTPATIENT
Start: 2022-10-12 | End: 2022-10-13

## 2022-10-06 RX ORDER — DULOXETIN HYDROCHLORIDE 30 MG/1
30 CAPSULE, DELAYED RELEASE ORAL DAILY
COMMUNITY

## 2022-10-06 NOTE — PERIOP NOTES
PAT - SURGICAL PRE-ADMISSION INSTRUCTIONS    NAME:  Emilia Dangelo                                                          TODAY'S DATE:  10/6/2022    SURGERY DATE:  10/12/2022                                  SURGERY ARRIVAL TIME:   TBD    Do NOT eat or drink anything, including candy or gum, after MIDNIGHT on 10/12/2022 , unless you have specific instructions from your Surgeon or Anesthesia Provider to do so. No smoking 24 hours before surgery. No alcohol 24 hours prior to the day of surgery. No recreational drugs for one week prior to the day of surgery. Leave all valuables, including money/purse, at home. Remove all jewelry, nail polish, makeup (including mascara); no lotions, powders, deodorant, or perfume/cologne/after shave. Glasses/Contact lenses and Dentures may be worn to the hospital.  They will be removed prior to surgery. Call your doctor if symptoms of a cold or illness develop within 24 ours prior to surgery. AN ADULT MUST DRIVE YOU HOME AFTER OUTPATIENT SURGERY. If you are having an OUTPATIENT procedure, please make arrangements for a responsible adult to be with you for 24 hours after your surgery. If you are admitted to the hospital, you will be assigned to a bed after surgery is complete. Normally a family member will not be able to see you until you are in your assigned bed. 12. Visitation Restrictions Explained. Pt has an advanced directive not on file. Pt is vaccinated. Covid vaccination record under immunization tab. Pt wrote down instructions for her daughter to follow as well. Daughter is an RN  PCP instructed pt not to take plavix and aspirin 7 days before surgery per pt. Special Instructions: Take amlopidine and duloxitine on dos with sips of water  Do not take lisinopril on dos. Pt instructed to avoid NSAIDs 7 days prior to procedure per MDA.    Pt instructed to hold vitamins/supplements starting today    Patient Prep:  use CHG solution, pt received, instructions reviewed. These surgical instructions were reviewed with patient during the PAT phone interview.

## 2022-10-06 NOTE — H&P
Pre-Admission History and Physical    Patient: Fardia Morales   MRN: [id number]   SSN: [id number]   YOB: 1945   Age: 76 y.o. Sex: female     Patient scheduled for: percutaneous permanent spinal cord stimulator. Date of surgery: 10/12/2022. Surgeon: Danilo Suggs MD    HPI:  Farida Morales is a 76 y.o. female with chronic back pain. She has had SCS trial and found is to be very successful. This patient has failed the presurgical conservative treatments  including physical therapy, spinal block injections and medications. Pain has impacted the patient's functional ability. She is being admitted for surgical intervention. Past Medical History:   Diagnosis Date    Anxiety     Arthritis     Cancer ([address]     Left breast cancer    Depression     GERD (gastroesophageal reflux disease)     Hypercholesteremia     Hypertension     Ill-defined condition     numbness right leg    Melanoma (HCC)     Nausea & vomiting      Social History     Socioeconomic History    Marital status:    Tobacco Use    Smoking status: Every Day     Packs/day: 1.00     Years: 30.00     Pack years: 30.00     Types: Cigarettes    Smokeless tobacco: Never    Tobacco comments:     instructed not to smoke 24 hrs prior surgery   Substance and Sexual Activity    Alcohol use: No    Drug use: No    Sexual activity: Never     Past Surgical History:   Procedure Laterality Date    HX BACK SURGERY  1990's    x 2    HX CERVICAL FUSION      anterior    HX HYSTERECTOMY      HX KNEE ARTHROSCOPY Left     HX LUMBAR FUSION  2018    HX MASTECTOMY Bilateral 1999    left axillary lymph node dissection    HX ORTHOPAEDIC Left     surgery for hip fx     HX ORTHOPAEDIC  1980's    trigger finger, release thumbs    HX ORTHOPAEDIC Left     heel surgery    HX OTHER SURGICAL      excisiion of melanoma right buttock    HX TUBAL LIGATION      HX UROLOGICAL  1990's    bladder suspension x 3     No family history on file.   Allergies   Allergen Reactions    Aleve [Naproxen Sodium] Shortness of Breath and Rash    Iodinated Contrast Media Rash and Cough    Triamterene-Hydrochlorothiazid Itching     Current Outpatient Medications   Medication Sig Dispense Refill    lisinopriL (PRINIVIL, ZESTRIL) 40 mg tablet Take 40 mg by mouth daily. ferrous sulfate 324 mg (65 mg iron) tablet Take  by mouth Daily (before breakfast). DULoxetine (CYMBALTA) 30 mg capsule Take 30 mg by mouth daily. Indications: neuropathic pain      clopidogrel (PLAVIX) 75 mg tab Take 75 mg by mouth daily. aspirin delayed-release 81 mg tablet Take 81 mg by mouth daily. amLODIPine (NORVASC) 10 mg tablet Take 10 mg by mouth daily. Indications: HYPERTENSION         ROS:  Denies chills, fever,night sweats,  bowel or bladder dysfunction, unexplained weight loss/weight gain, chest pain, sob or anxiety. Physical Examination    Gen: Well developed, well nourished 76 y.o. female with normal strength. Kyphotic stature. She has had previous posterior lumbar decompression fusion. Normal reflexes. Normal sensation    Assessment and Plan    Due to the pt's persistent symptoms unrelieved by conservative measure Zuri Pearson is being admitted to undergo surgical intervention. The post-operative plan of care consists of physical therapy, home health and a 2 week f/u office visit. The risks, benefits, complications and alternatives to surgery have been discussed in detail with the patient. The patient understands and agrees to proceed.

## 2022-10-08 LAB
BACTERIA SPEC CULT: NORMAL
BACTERIA SPEC CULT: NORMAL
SERVICE CMNT-IMP: NORMAL

## 2022-10-11 ENCOUNTER — ANESTHESIA EVENT (OUTPATIENT)
Dept: SURGERY | Age: 77
End: 2022-10-11
Payer: MEDICARE

## 2022-10-12 ENCOUNTER — ANESTHESIA (OUTPATIENT)
Dept: SURGERY | Age: 77
End: 2022-10-12
Payer: MEDICARE

## 2022-10-12 ENCOUNTER — HOSPITAL ENCOUNTER (OUTPATIENT)
Age: 77
Setting detail: OUTPATIENT SURGERY
Discharge: HOME | End: 2022-10-12
Attending: ORTHOPAEDIC SURGERY | Admitting: ORTHOPAEDIC SURGERY
Payer: MEDICARE

## 2022-10-12 ENCOUNTER — APPOINTMENT (OUTPATIENT)
Dept: GENERAL RADIOLOGY | Age: 77
End: 2022-10-12
Attending: ORTHOPAEDIC SURGERY
Payer: MEDICARE

## 2022-10-12 VITALS
TEMPERATURE: 97.7 F | DIASTOLIC BLOOD PRESSURE: 54 MMHG | OXYGEN SATURATION: 96 % | HEART RATE: 65 BPM | WEIGHT: 106.8 LBS | RESPIRATION RATE: 16 BRPM | BODY MASS INDEX: 18.92 KG/M2 | SYSTOLIC BLOOD PRESSURE: 133 MMHG | HEIGHT: 63 IN

## 2022-10-12 DIAGNOSIS — Z96.89 S/P INSERTION OF SPINAL CORD STIMULATOR: Primary | ICD-10-CM

## 2022-10-12 PROCEDURE — 77030031140 HC SUT VCRL3 J&J -A: Performed by: ORTHOPAEDIC SURGERY

## 2022-10-12 PROCEDURE — 77030008477 HC STYL SATN SLP COVD -A: Performed by: ANESTHESIOLOGY

## 2022-10-12 PROCEDURE — C1713 ANCHOR/SCREW BN/BN,TIS/BN: HCPCS | Performed by: ORTHOPAEDIC SURGERY

## 2022-10-12 PROCEDURE — 76010000153 HC OR TIME 1.5 TO 2 HR: Performed by: ORTHOPAEDIC SURGERY

## 2022-10-12 PROCEDURE — 76210000006 HC OR PH I REC 0.5 TO 1 HR: Performed by: ORTHOPAEDIC SURGERY

## 2022-10-12 PROCEDURE — 74011250636 HC RX REV CODE- 250/636: Performed by: ORTHOPAEDIC SURGERY

## 2022-10-12 PROCEDURE — C1778 LEAD, NEUROSTIMULATOR: HCPCS | Performed by: ORTHOPAEDIC SURGERY

## 2022-10-12 PROCEDURE — 77030038930 HC PRRGR RECHRG MEDT -H1: Performed by: ORTHOPAEDIC SURGERY

## 2022-10-12 PROCEDURE — 76060000034 HC ANESTHESIA 1.5 TO 2 HR: Performed by: ORTHOPAEDIC SURGERY

## 2022-10-12 PROCEDURE — 74011250636 HC RX REV CODE- 250/636: Performed by: NURSE ANESTHETIST, CERTIFIED REGISTERED

## 2022-10-12 PROCEDURE — 77030028271 HC SRGFL HEMSTAT MTRX KT J&J -C: Performed by: ORTHOPAEDIC SURGERY

## 2022-10-12 PROCEDURE — 74011000258 HC RX REV CODE- 258: Performed by: ORTHOPAEDIC SURGERY

## 2022-10-12 PROCEDURE — 74011000250 HC RX REV CODE- 250

## 2022-10-12 PROCEDURE — 77030034479 HC ADH SKN CLSR PRINEO J&J -B: Performed by: ORTHOPAEDIC SURGERY

## 2022-10-12 PROCEDURE — 2709999900 HC NON-CHARGEABLE SUPPLY: Performed by: ORTHOPAEDIC SURGERY

## 2022-10-12 PROCEDURE — 77030035236 HC SUT PDS STRATFX BARB J&J -B: Performed by: ORTHOPAEDIC SURGERY

## 2022-10-12 PROCEDURE — 77030006643: Performed by: ANESTHESIOLOGY

## 2022-10-12 PROCEDURE — 76210000020 HC REC RM PH II FIRST 0.5 HR: Performed by: ORTHOPAEDIC SURGERY

## 2022-10-12 PROCEDURE — C1820 GENERATOR NEURO RECHG BAT SY: HCPCS | Performed by: ORTHOPAEDIC SURGERY

## 2022-10-12 PROCEDURE — 77030002996 HC SUT SLK J&J -A: Performed by: ORTHOPAEDIC SURGERY

## 2022-10-12 PROCEDURE — 77030020782 HC GWN BAIR PAWS FLX 3M -B: Performed by: ORTHOPAEDIC SURGERY

## 2022-10-12 PROCEDURE — 74011250636 HC RX REV CODE- 250/636

## 2022-10-12 PROCEDURE — 77030008683 HC TU ET CUF COVD -A: Performed by: ANESTHESIOLOGY

## 2022-10-12 PROCEDURE — 77030002933 HC SUT MCRYL J&J -A: Performed by: ORTHOPAEDIC SURGERY

## 2022-10-12 PROCEDURE — 74011250636 HC RX REV CODE- 250/636: Performed by: ANESTHESIOLOGY

## 2022-10-12 PROCEDURE — 74011000250 HC RX REV CODE- 250: Performed by: ORTHOPAEDIC SURGERY

## 2022-10-12 DEVICE — KIT LD L60CM 8 ELECTRD PERC COMP CONTAIN LD ANCHR GWIRE NDL: Type: IMPLANTABLE DEVICE | Site: SPINE THORACIC | Status: FUNCTIONAL

## 2022-10-12 DEVICE — DEVICE NEUROSTIMULATOR 13.9CC W1.9XH2.2IN 29.1GM RECHRG: Type: IMPLANTABLE DEVICE | Site: BACK | Status: FUNCTIONAL

## 2022-10-12 RX ORDER — ONDANSETRON 2 MG/ML
INJECTION INTRAMUSCULAR; INTRAVENOUS AS NEEDED
Status: DISCONTINUED | OUTPATIENT
Start: 2022-10-12 | End: 2022-10-12 | Stop reason: HOSPADM

## 2022-10-12 RX ORDER — LIDOCAINE HYDROCHLORIDE 20 MG/ML
INJECTION, SOLUTION EPIDURAL; INFILTRATION; INTRACAUDAL; PERINEURAL AS NEEDED
Status: DISCONTINUED | OUTPATIENT
Start: 2022-10-12 | End: 2022-10-12 | Stop reason: HOSPADM

## 2022-10-12 RX ORDER — SODIUM CHLORIDE, SODIUM LACTATE, POTASSIUM CHLORIDE, CALCIUM CHLORIDE 600; 310; 30; 20 MG/100ML; MG/100ML; MG/100ML; MG/100ML
75 INJECTION, SOLUTION INTRAVENOUS CONTINUOUS
Status: DISCONTINUED | OUTPATIENT
Start: 2022-10-12 | End: 2022-10-12 | Stop reason: HOSPADM

## 2022-10-12 RX ORDER — VANCOMYCIN HYDROCHLORIDE 1 G/20ML
INJECTION, POWDER, LYOPHILIZED, FOR SOLUTION INTRAVENOUS AS NEEDED
Status: DISCONTINUED | OUTPATIENT
Start: 2022-10-12 | End: 2022-10-12 | Stop reason: HOSPADM

## 2022-10-12 RX ORDER — EPHEDRINE SULFATE/0.9% NACL/PF 50 MG/5 ML
SYRINGE (ML) INTRAVENOUS AS NEEDED
Status: DISCONTINUED | OUTPATIENT
Start: 2022-10-12 | End: 2022-10-12 | Stop reason: HOSPADM

## 2022-10-12 RX ORDER — ALBUTEROL SULFATE 0.83 MG/ML
2.5 SOLUTION RESPIRATORY (INHALATION) AS NEEDED
Status: DISCONTINUED | OUTPATIENT
Start: 2022-10-12 | End: 2022-10-12 | Stop reason: HOSPADM

## 2022-10-12 RX ORDER — EPHEDRINE SULFATE/0.9% NACL/PF 50 MG/5 ML
SYRINGE (ML) INTRAVENOUS AS NEEDED
Status: DISCONTINUED | OUTPATIENT
Start: 2022-10-12 | End: 2022-10-12

## 2022-10-12 RX ORDER — SODIUM CHLORIDE 0.9 % (FLUSH) 0.9 %
5-40 SYRINGE (ML) INJECTION AS NEEDED
Status: DISCONTINUED | OUTPATIENT
Start: 2022-10-12 | End: 2022-10-12 | Stop reason: HOSPADM

## 2022-10-12 RX ORDER — ROCURONIUM BROMIDE 10 MG/ML
INJECTION, SOLUTION INTRAVENOUS AS NEEDED
Status: DISCONTINUED | OUTPATIENT
Start: 2022-10-12 | End: 2022-10-12 | Stop reason: HOSPADM

## 2022-10-12 RX ORDER — DEXAMETHASONE SODIUM PHOSPHATE 4 MG/ML
INJECTION, SOLUTION INTRA-ARTICULAR; INTRALESIONAL; INTRAMUSCULAR; INTRAVENOUS; SOFT TISSUE AS NEEDED
Status: DISCONTINUED | OUTPATIENT
Start: 2022-10-12 | End: 2022-10-12 | Stop reason: HOSPADM

## 2022-10-12 RX ORDER — METOCLOPRAMIDE HYDROCHLORIDE 5 MG/ML
INJECTION INTRAMUSCULAR; INTRAVENOUS AS NEEDED
Status: DISCONTINUED | OUTPATIENT
Start: 2022-10-12 | End: 2022-10-12 | Stop reason: HOSPADM

## 2022-10-12 RX ORDER — PROPOFOL 10 MG/ML
INJECTION, EMULSION INTRAVENOUS AS NEEDED
Status: DISCONTINUED | OUTPATIENT
Start: 2022-10-12 | End: 2022-10-12 | Stop reason: HOSPADM

## 2022-10-12 RX ORDER — HYDROMORPHONE HYDROCHLORIDE 1 MG/ML
0.5 INJECTION, SOLUTION INTRAMUSCULAR; INTRAVENOUS; SUBCUTANEOUS
Status: DISCONTINUED | OUTPATIENT
Start: 2022-10-12 | End: 2022-10-12 | Stop reason: HOSPADM

## 2022-10-12 RX ORDER — FENTANYL CITRATE 50 UG/ML
25 INJECTION, SOLUTION INTRAMUSCULAR; INTRAVENOUS AS NEEDED
Status: DISCONTINUED | OUTPATIENT
Start: 2022-10-12 | End: 2022-10-12 | Stop reason: HOSPADM

## 2022-10-12 RX ORDER — SODIUM CHLORIDE 0.9 % (FLUSH) 0.9 %
5-40 SYRINGE (ML) INJECTION EVERY 8 HOURS
Status: DISCONTINUED | OUTPATIENT
Start: 2022-10-12 | End: 2022-10-12 | Stop reason: HOSPADM

## 2022-10-12 RX ORDER — NEOSTIGMINE METHYLSULFATE 1 MG/ML
INJECTION, SOLUTION INTRAVENOUS AS NEEDED
Status: DISCONTINUED | OUTPATIENT
Start: 2022-10-12 | End: 2022-10-12 | Stop reason: HOSPADM

## 2022-10-12 RX ORDER — FENTANYL CITRATE 50 UG/ML
INJECTION, SOLUTION INTRAMUSCULAR; INTRAVENOUS AS NEEDED
Status: DISCONTINUED | OUTPATIENT
Start: 2022-10-12 | End: 2022-10-12 | Stop reason: HOSPADM

## 2022-10-12 RX ORDER — GLYCOPYRROLATE 0.2 MG/ML
INJECTION INTRAMUSCULAR; INTRAVENOUS AS NEEDED
Status: DISCONTINUED | OUTPATIENT
Start: 2022-10-12 | End: 2022-10-12 | Stop reason: HOSPADM

## 2022-10-12 RX ORDER — MAGNESIUM SULFATE 100 %
4 CRYSTALS MISCELLANEOUS AS NEEDED
Status: DISCONTINUED | OUTPATIENT
Start: 2022-10-12 | End: 2022-10-12 | Stop reason: HOSPADM

## 2022-10-12 RX ORDER — INSULIN LISPRO 100 [IU]/ML
INJECTION, SOLUTION INTRAVENOUS; SUBCUTANEOUS ONCE
Status: DISCONTINUED | OUTPATIENT
Start: 2022-10-12 | End: 2022-10-12 | Stop reason: HOSPADM

## 2022-10-12 RX ORDER — OXYCODONE HYDROCHLORIDE 5 MG/1
5 TABLET ORAL
Qty: 20 TABLET | Refills: 0 | Status: SHIPPED | OUTPATIENT
Start: 2022-10-12 | End: 2022-10-17

## 2022-10-12 RX ORDER — CEFAZOLIN SODIUM/WATER 2 G/20 ML
2 SYRINGE (ML) INTRAVENOUS
Status: COMPLETED | OUTPATIENT
Start: 2022-10-12 | End: 2022-10-12

## 2022-10-12 RX ORDER — SODIUM CHLORIDE, SODIUM LACTATE, POTASSIUM CHLORIDE, CALCIUM CHLORIDE 600; 310; 30; 20 MG/100ML; MG/100ML; MG/100ML; MG/100ML
125 INJECTION, SOLUTION INTRAVENOUS CONTINUOUS
Status: DISCONTINUED | OUTPATIENT
Start: 2022-10-12 | End: 2022-10-12 | Stop reason: HOSPADM

## 2022-10-12 RX ORDER — KETAMINE HYDROCHLORIDE 10 MG/ML
INJECTION, SOLUTION INTRAMUSCULAR; INTRAVENOUS AS NEEDED
Status: DISCONTINUED | OUTPATIENT
Start: 2022-10-12 | End: 2022-10-12 | Stop reason: HOSPADM

## 2022-10-12 RX ADMIN — Medication 10 MG: at 15:10

## 2022-10-12 RX ADMIN — Medication 10 MG: at 14:48

## 2022-10-12 RX ADMIN — PROPOFOL 10 MG: 10 INJECTION, EMULSION INTRAVENOUS at 15:44

## 2022-10-12 RX ADMIN — DEXAMETHASONE SODIUM PHOSPHATE 4 MG: 4 INJECTION, SOLUTION INTRAMUSCULAR; INTRAVENOUS at 14:25

## 2022-10-12 RX ADMIN — Medication 10 MG: at 14:40

## 2022-10-12 RX ADMIN — FENTANYL CITRATE 50 MCG: 50 INJECTION, SOLUTION INTRAMUSCULAR; INTRAVENOUS at 15:28

## 2022-10-12 RX ADMIN — FENTANYL CITRATE 50 MCG: 50 INJECTION, SOLUTION INTRAMUSCULAR; INTRAVENOUS at 14:14

## 2022-10-12 RX ADMIN — ROCURONIUM BROMIDE 25 MG: 10 INJECTION, SOLUTION INTRAVENOUS at 14:16

## 2022-10-12 RX ADMIN — KETAMINE HYDROCHLORIDE 20 MG: 10 INJECTION, SOLUTION INTRAMUSCULAR; INTRAVENOUS at 14:32

## 2022-10-12 RX ADMIN — PROPOFOL 80 MG: 10 INJECTION, EMULSION INTRAVENOUS at 14:14

## 2022-10-12 RX ADMIN — GLYCOPYRROLATE 0.4 MG: 0.2 INJECTION INTRAMUSCULAR; INTRAVENOUS at 15:24

## 2022-10-12 RX ADMIN — SODIUM CHLORIDE, SODIUM LACTATE, POTASSIUM CHLORIDE, AND CALCIUM CHLORIDE 75 ML/HR: 600; 310; 30; 20 INJECTION, SOLUTION INTRAVENOUS at 16:24

## 2022-10-12 RX ADMIN — LIDOCAINE HYDROCHLORIDE 50 MG: 20 INJECTION, SOLUTION EPIDURAL; INFILTRATION; INTRACAUDAL; PERINEURAL at 14:14

## 2022-10-12 RX ADMIN — Medication 3 MG: at 15:24

## 2022-10-12 RX ADMIN — ONDANSETRON HYDROCHLORIDE 4 MG: 2 INJECTION INTRAMUSCULAR; INTRAVENOUS at 15:19

## 2022-10-12 RX ADMIN — Medication 2 G: at 14:30

## 2022-10-12 RX ADMIN — FENTANYL CITRATE 50 MCG: 50 INJECTION, SOLUTION INTRAMUSCULAR; INTRAVENOUS at 14:23

## 2022-10-12 RX ADMIN — HYDROMORPHONE HYDROCHLORIDE 0.5 MG: 1 INJECTION, SOLUTION INTRAMUSCULAR; INTRAVENOUS; SUBCUTANEOUS at 16:20

## 2022-10-12 RX ADMIN — METOCLOPRAMIDE 5 MG: 5 INJECTION, SOLUTION INTRAMUSCULAR; INTRAVENOUS at 14:30

## 2022-10-12 RX ADMIN — PROPOFOL 30 MG: 10 INJECTION, EMULSION INTRAVENOUS at 14:15

## 2022-10-12 RX ADMIN — SODIUM CHLORIDE, SODIUM LACTATE, POTASSIUM CHLORIDE, AND CALCIUM CHLORIDE 125 ML/HR: 600; 310; 30; 20 INJECTION, SOLUTION INTRAVENOUS at 13:42

## 2022-10-12 NOTE — ANESTHESIA PREPROCEDURE EVALUATION
Anesthetic History     PONV          Review of Systems / Medical History  Patient summary reviewed, nursing notes reviewed and pertinent labs reviewed    Pulmonary          Smoker ( did smoke today)    Pertinent negatives: No COPD and asthma     Neuro/Psych         TIA and psychiatric history  Pertinent negatives: No CVA (CT only, no Sx)   Cardiovascular    Hypertension: well controlled            Pertinent negatives: No past MI and CAD  Exercise tolerance: >4 METS     GI/Hepatic/Renal     GERD: well controlled        Pertinent negatives: No liver disease and renal disease   Endo/Other        Arthritis     Other Findings   Comments: etoh neg           Physical Exam    Airway  Mallampati: III  TM Distance: 4 - 6 cm  Neck ROM: normal range of motion   Mouth opening: Normal     Cardiovascular    Rhythm: regular  Rate: normal         Dental    Dentition: Edentulous     Pulmonary                 Abdominal         Other Findings            Anesthetic Plan    ASA: 3  Anesthesia type: MAC          Induction: Intravenous  Anesthetic plan and risks discussed with: Patient

## 2022-10-12 NOTE — DISCHARGE INSTRUCTIONS
Dr. Leonardo Rolle    DO NOT take any NSAIDS if you had Fusion Surgery. ACTIVITIES:  *The first week after surgery   Change positions every hour while you are awake. Walking is the best way to rebuild strength. Activities around the house, such as washing dishes and preparing light meals are fine. Avoid strenuous activities, such as vacuuming, and do not lift anything heavier than 1 gallon of milk (or about 5-8 pounds). Do not bend over to  items from the ground level until 3 months post-op. *Week 2 and beyond  You may gradually increase your activities, but avoid heavy lifting, pushing/pulling. Walk at a pace that avoids fatigue or severe pain. Do not try to walk several blocks the first day! As you increase the distance, you may feel tired. If so, stop and rest.   Follow-up with Dr. Jaimie Hooks will be 2 weeks after surgery. BATHING and INCISION CARE:  The incision may be tender or feel numb: this is normal.   Keep the incision clean and dry. You may shower 3 days after surgery. Cover the dressing with saran wrap before getting in the shower. The incision is closed with sutures under the skin and glue on top. Do not apply any lotions, ointments or oils on the incision. Do not remove the dressing. Your dressing will be changed at your first post op appointment. If it comes loose or is damaged, dirty or wet before this appointment, call your home health nurse (if you are being seen by a nurse at home) or the office to have the dressing changed. If you notice any excessive swelling, redness, or persistent drainage around the incision, notify the office immediately. CONSTIPATION:  Take a stool softener twice a day while you are taking a narcotic.    If you have not had a bowel movement within 3 days of surgery, you will need to use a laxative or suppository that can be obtained over the counter at your local pharmacy     ICE  Use ice on your back to decrease pain and swelling. Do NOT use heat. MEDICATIONS:  If you had fusion surgery DO NOT TAKE non-steroidal anti-inflammatory (NSAID) medications, such as Motrin, Aleve, Advil Naprosyn, Ibuprofen or aspirin. Take Tylenol/Acetaminophen every 4-6 hours for pain. Do not take more than 3000 mg each day. (Do not take Tylenol/Acetaminophen if you have liver problems). Take your prescribed narcotic pain medication as needed for pain that is not tolerable. Eat food before you take any pain medication to avoid nausea. If you need a medication refill, please call the office during working hours at least 2 days before your prescription runs out. Do not wait until your bottle is empty to call for a refill. NUTRITION:  Eat healthy to help your wound to heal.    Eat a healthy balanced diet to help your wound to heal. Protein supplements should be considered if you are eating less than 50% of your meal.   Drink plenty of water to stay hydrated. DRIVING & RETURN TO WORK:   You will be told at your follow up appointment if it is safe for you to drive or return to work and will be provided with a [id number]-note if needed (please ask). NEVER drive while taking narcotic medication. WHEN TO CALL THE OFFICE:  If you have severe pain unrelieved by the medications, new numbness or tingling in your legs; If you have a fever of 101.0°F or greater   If you notice increased swelling, redness, or increased drainage from the incision    If you are not able to urinate  If you are not able to control your bowels       [address] [place] [address] [address] number is [phone]. They are open from 8:00am to 5:00pm Mon - Fri. After 5:00pm, or on weekends/holidays, please call the answering service at [phone] for a call back.

## 2022-10-12 NOTE — INTERVAL H&P NOTE
Update History & Physical    The Patient's History and Physical of October 6, 2022 was reviewed with the patient and I examined the patient. There was no change. The surgical site was confirmed by the patient and me. Plan:  The risk, benefits, expected outcome, and alternative to the recommended procedure have been discussed with the patient. Patient understands and wants to proceed with the procedure.     Electronically signed by Santana Neely MD on 10/12/2022 at 1:37 PM

## 2022-10-12 NOTE — BRIEF OP NOTE
Brief Postoperative Note    Patient: Nash Yuen  YOB: 1945  MRN: [id number]    Date of Procedure: 10/12/2022     Pre-Op Diagnosis: CHRONIC PAIN    Post-Op Diagnosis: Same as preoperative diagnosis. Procedure(s):  PERCUTANEOUS PERMANENT SPINAL CORD STIMULATOR 2 leadsWITH C-ARM    Surgeon(s):  Rafia Blanco MD    Surgical Assistant: Surg Asst-1: Mariella Anderson    Anesthesia: General     Estimated Blood Loss (mL): Minimal    Complications: None    Specimens: * No specimens in log *     Implants:   Implant Name Type Inv. Item Serial No.  Lot No. LRB No. Used Action   DEVICE NEUROSTIMULATOR 13. 1780 Valentine Kavin W1.9XH2.2IN 29. 1GM RECHRG - [id number]  DEVICE NEUROSTIMULATOR 13. 1780 Valentine Kavin W1.9XH2.2IN 29. 1GM 2272 [employer] [id number] [employer]WD  Left 1 Implanted   KIT LD L60CM 8 ELECTRD PERC COMP CONTAIN LD ANCHR GWIRE NDL - [id number]  KIT LD L60CM 8 ELECTRD PERC COMP CONTAIN LD ANCHR GWIRE NDL  [employer] [place] [employer]WD [id number] N/A 1 Implanted   KIT LD L60CM 8 ELECTRD PERC COMP CONTAIN LD ANCHR GWIRE NDL - [id number]  KIT LD L60CM 8 ELECTRD PERC COMP CONTAIN  [address] [employer]_WD [id number] N/A 1 Implanted       Drains: * No LDAs found *    Findings: top of t8    Electronically Signed by Elle Davila MD on 10/12/2022 at 3:30 PM

## 2022-10-12 NOTE — PERIOP NOTES
Reviewed PTA medication list with patient/caregiver and patient/caregiver denies any additional medications. Patient admits to having a responsible adult care for them at home for at least 24 hours after surgery. Patient encouraged to use gown warming system and informed that using said warming gown to regulate body temperature prior to a procedure has been shown to help reduce the risks of blood clots and infection. Patient's pharmacy of choice verified and documented in PTA medication section. Dual skin assessment & fall risk band verification completed with Rose PEREIRA.

## 2022-10-12 NOTE — PERIOP NOTES
Reoriented to place and time complaining of dry mouth. Able to move all extremities vital signs with in limits.

## 2022-10-12 NOTE — ANESTHESIA POSTPROCEDURE EVALUATION
Procedure(s):  PERCUTANEOUS PERMANENT SPINAL CORD STIMULATOR WITH C-ARM.     MAC    Anesthesia Post Evaluation      Multimodal analgesia: multimodal analgesia used between 6 hours prior to anesthesia start to PACU discharge  Patient location during evaluation: PACU  Patient participation: complete - patient participated  Level of consciousness: awake and alert  Pain score: 0  Pain management: adequate  Airway patency: patent  Anesthetic complications: no  Cardiovascular status: acceptable  Respiratory status: acceptable  Hydration status: acceptable  Post anesthesia nausea and vomiting:  none  Final Post Anesthesia Temperature Assessment:  Normothermia (36.0-37.5 degrees C)      INITIAL Post-op Vital signs:   Vitals Value Taken Time   /52 10/12/22 1634   Temp 36.3 °C (97.4 °F) 10/12/22 1558   Pulse 59 10/12/22 1635   Resp 14 10/12/22 1635   SpO2 97 % 10/12/22 1635

## 2022-10-12 NOTE — PERIOP NOTES
Patient's spinal cord stimulator supplies taken with patient to PACU in black bag with patient label on it. Patient is not to turn on until tomorrow. Per reps patient is aware. Recovery nurse aware.

## 2022-10-12 NOTE — PERIOP NOTES
TRANSFER - IN REPORT:    Verbal report received from ORN & CRNA on [facility]  being received from OR (unit) for routine post - op      Report consisted of patients Situation, Background, Assessment and   Recommendations(SBAR). Information from the following report(s) SBAR was reviewed with the receiving nurse. Opportunity for questions and clarification was provided. Assessment completed upon patients arrival to unit and care assumed.

## 2022-10-13 NOTE — OP NOTES
[facility] [place]  OPERATIVE REPORT    Name:  Antony Nolan  MR#:   [id number]  :  1945  ACCOUNT #:  [de-identified]  DATE OF SERVICE:  10/12/2022    PREOPERATIVE DIAGNOSES:  Chronic pain; post-laminectomy syndrome, lumbar. POSTOPERATIVE DIAGNOSES:  Chronic pain; post-laminectomy syndrome, lumbar. PROCEDURES PERFORMED:  Implantation of dual-lead permanent spinal cord stimulator, [employer] type, implantation of generator. SURGEON:  Alis Kim MD    ASSISTANT:  None. ANESTHESIA:  General endotracheal.    COMPLICATIONS:  None. SPECIMENS REMOVED:  None. IMPLANTS:  Dual-lead permanent spinal cord stimulator, leads and generator. ESTIMATED BLOOD LOSS:  Minimal.    FINDINGS:  The patient had difficult needle placement because of arthritic change. We were able to get midline placement with the tip at the top of T8 consistent with the patient's trial.    PROCEDURE:  Following induction of endotracheal anesthesia, the patient was turned in prone position on a spinal frame. The patient was prepped and draped in usual fashion. Paramedian incision was made on the left and mid lumbar spine. Epidural access was obtained at L1-2. This was done through loss of resistance technique. Two leads were floated and on looking at them, they both went anterior. We tried multiple attempts to steer them and maintain them in a posterior position and I could not. I undermined the skin and went to the contralateral side and again with loss of resistance technique, I had epidural access, and this time I was able to float the leads maintaining a midline position posteriorly. Two leads were placed, tip at the superior endplate of T8. They were secured with the standard tie-down technique. A pocket was made in the left flank consistent with the patient's preoperative desires and tunneling device used to extend the leads to the pocket after they were tied down utilizing the securing lockdowns.   The continuity test done with the battery and confirmed excess lead coiled beneath the battery. The battery placed within the pocket. Continuity test again confirmed. Vancomycin powder instilled in both incisions. Subcutaneous tissue was closed with 2-0 Vicryl, skin closed with 3-0 Monocryl subcuticular suture and Dermabond. A sterile occlusive dressing was placed upon the wound. All counts were correct.       J Luis Romero MD      [id number]  D:  10/12/2022 15:46  T:  10/13/2022 4:40  JOB #:  [id number]

## (undated) DEVICE — SYSTEM SKIN CLSR 22CM DERMBND PRINEO

## (undated) DEVICE — INSULATED BLADE ELECTRODE: Brand: EDGE

## (undated) DEVICE — MINOR: Brand: MEDLINE INDUSTRIES, INC.

## (undated) DEVICE — DRESSING FOAM SELF ADH 20X10 CM ABSORBENT MEPILEX BORDER

## (undated) DEVICE — Device

## (undated) DEVICE — TRAY CATH 16FR DRN BG LF -- CONVERT TO ITEM 363158

## (undated) DEVICE — SYR LR LCK 1ML GRAD NSAF 30ML --

## (undated) DEVICE — PREP SKN PREVAIL 40ML APPL --

## (undated) DEVICE — SINGLE PORT MANIFOLD: Brand: NEPTUNE 2

## (undated) DEVICE — STERILE POLYISOPRENE POWDER-FREE SURGICAL GLOVES: Brand: PROTEXIS

## (undated) DEVICE — SUTURE MCRYL SZ 3-0 L27IN ABSRB UD L26MM SH 1/2 CIR Y416H

## (undated) DEVICE — SOL INJ L R 1000ML BG --

## (undated) DEVICE — PLUS HANDPIECE WITH SPRAY TIP: Brand: SURGILAV

## (undated) DEVICE — DRAIN KT WND 10FR RND 400ML --

## (undated) DEVICE — SUT MONOCRYL PLUS UD 4-0 --

## (undated) DEVICE — NON-ADHERENT STRIPS,OIL EMULSION: Brand: CURITY

## (undated) DEVICE — SOLUTION LACTATED RINGERS INJECTION USP

## (undated) DEVICE — STERILE POLYISOPRENE POWDER-FREE SURGICAL GLOVES WITH EMOLLIENT COATING: Brand: PROTEXIS

## (undated) DEVICE — SUT SLK 0 30IN SH BLK --

## (undated) DEVICE — NEEDLE SPNL 20GA L3.5IN YEL HUB S STL REG WALL FIT STYL W/

## (undated) DEVICE — ROUND DISSECTORS: Brand: DEROYAL

## (undated) DEVICE — SPONGE GZ W4XL4IN COT 12 PLY TYP VII WVN C FLD DSGN

## (undated) DEVICE — T5 HOOD WITH PEEL AWAY FACE SHIELD

## (undated) DEVICE — KENDALL SCD EXPRESS SLEEVES, KNEE LENGTH, MEDIUM: Brand: KENDALL SCD

## (undated) DEVICE — GOWN,AURORA,NONRNF,XL,30/CS: Brand: MEDLINE

## (undated) DEVICE — Z DISCONTINUED USE (MFG CAT 7984-37) SOLUTION IV SODIUM CHL 0.9% 100 ML INJ

## (undated) DEVICE — SUTURE STRATAFIX 2-0 PDS PLUS 30CM VIO SXPP1A431

## (undated) DEVICE — PACK PROCEDURE SURG ANTR HIP

## (undated) DEVICE — SOL IRR STRL H2O 1500ML BTL --

## (undated) DEVICE — 3.0MM PRECISION NEURO (MATCH HEAD)

## (undated) DEVICE — JACKSON TABLE POSITIONER KIT: Brand: MEDLINE INDUSTRIES, INC.

## (undated) DEVICE — SYRINGE BLB 50CC IRRIG PLIABLE FNGR FLNG GRAD FLSK DISP

## (undated) DEVICE — SUTURE STRATAFIX SYMMETRIC PDS + SZ 1 L18IN ABSRB VLT L48MM SXPP1A400

## (undated) DEVICE — SHEET,DRAPE,70X85,STERILE: Brand: MEDLINE

## (undated) DEVICE — SOL IRRIGATION INJ NACL 0.9% 500ML BTL

## (undated) DEVICE — MASTISOL ADHESIVE LIQ 2/3ML

## (undated) DEVICE — SUT VCRL + 2-0 27IN CT2 UD -- 36/BX

## (undated) DEVICE — SUTURE ETHLN SZ 4-0 L18IN NONABSORBABLE BLK L19MM PS-2 3/8 1667H

## (undated) DEVICE — (D)PREP SKN CHLRAPRP APPL 26ML -- CONVERT TO ITEM 371833

## (undated) DEVICE — NEEDLE HYPO 21GA L1.5IN INTRAMUSCULAR S STL LATCH BVL UP

## (undated) DEVICE — 1010 S-DRAPE TOWEL DRAPE 10/BX: Brand: STERI-DRAPE™

## (undated) DEVICE — SEALANT HEMOSTAT W/THROM 8ML -- SURGIFLO MATRIX

## (undated) DEVICE — ABDOMINAL PAD: Brand: DERMACEA

## (undated) DEVICE — BONE WAX WHITE: Brand: BONE WAX WHITE

## (undated) DEVICE — INTENDED FOR TISSUE SEPARATION, AND OTHER PROCEDURES THAT REQUIRE A SHARP SURGICAL BLADE TO PUNCTURE OR CUT.: Brand: BARD-PARKER SAFETY BLADES SIZE 20, STERILE

## (undated) DEVICE — INJEX BUMPY ANCHOR

## (undated) DEVICE — SUTURE ABSORBABLE BRAIDED 1-0 OS-8 CR 3X18 IN UD VICRYL J757T

## (undated) DEVICE — DRAIN SURG 15FR L3/16IN SIL RND 3/4 FLUT 3/16IN TRCR

## (undated) DEVICE — REM POLYHESIVE ADULT PATIENT RETURN ELECTRODE: Brand: VALLEYLAB

## (undated) DEVICE — PACK PROCEDURE SURG LAMINECTOMY SPINE CUST

## (undated) DEVICE — PREP SKN CHLRAPRP APL 26ML STR --

## (undated) DEVICE — GLOVE ORANGE PI 8   MSG9080

## (undated) DEVICE — LEGGINGS, PAIR, 31X48, STERILE: Brand: MEDLINE

## (undated) DEVICE — SUTURE VCRL + SZ 2-0 L18IN ABSRB VLT CT-2 1/2 CIR TAPERCUT VCP726D

## (undated) DEVICE — SYSTEM RECHARGING NEUROSTIMULATOR RECHRG BTTRY PK PWR SUPL

## (undated) DEVICE — OPTIFOAM GENTLE LITE, BORDERED, 4X4: Brand: MEDLINE

## (undated) DEVICE — X-RAY SPONGES,12 PLY: Brand: DERMACEA

## (undated) DEVICE — BLADE SAW 1.27X13X90 MM FOR LG BNE

## (undated) DEVICE — SUT VCRL + 1 36IN CT1 VIO --

## (undated) DEVICE — CONTROLLER INTELLIS PTM NONTRAIL

## (undated) DEVICE — SHEET,DRAPE,40X58,STERILE: Brand: MEDLINE

## (undated) DEVICE — DRAPE,UTILITY,XL,4/PK,STERILE: Brand: MEDLINE

## (undated) DEVICE — BIPOLAR SEALER 23-121-1 AQM EVS: Brand: AQUAMANTYS™